# Patient Record
Sex: MALE | Race: WHITE | Employment: FULL TIME | URBAN - METROPOLITAN AREA
[De-identification: names, ages, dates, MRNs, and addresses within clinical notes are randomized per-mention and may not be internally consistent; named-entity substitution may affect disease eponyms.]

---

## 2021-06-17 ENCOUNTER — HOSPITAL ENCOUNTER (OUTPATIENT)
Age: 61
Setting detail: OBSERVATION
Discharge: HOME | End: 2021-06-17
Attending: STUDENT IN AN ORGANIZED HEALTH CARE EDUCATION/TRAINING PROGRAM | Admitting: INTERNAL MEDICINE
Payer: COMMERCIAL

## 2021-06-17 ENCOUNTER — APPOINTMENT (OUTPATIENT)
Dept: NON INVASIVE DIAGNOSTICS | Age: 61
End: 2021-06-17
Attending: INTERNAL MEDICINE
Payer: COMMERCIAL

## 2021-06-17 VITALS
DIASTOLIC BLOOD PRESSURE: 79 MMHG | HEART RATE: 77 BPM | BODY MASS INDEX: 26.77 KG/M2 | HEIGHT: 73 IN | RESPIRATION RATE: 14 BRPM | SYSTOLIC BLOOD PRESSURE: 135 MMHG | OXYGEN SATURATION: 97 % | WEIGHT: 202 LBS | TEMPERATURE: 98.7 F

## 2021-06-17 DIAGNOSIS — I48.92 ATRIAL FLUTTER WITH RAPID VENTRICULAR RESPONSE (HCC): Primary | ICD-10-CM

## 2021-06-17 PROBLEM — I10 HTN (HYPERTENSION), BENIGN: Status: ACTIVE | Noted: 2021-06-17

## 2021-06-17 LAB
ALBUMIN SERPL-MCNC: 4.3 G/DL (ref 3.5–5)
ALBUMIN/GLOB SERPL: 1.3 {RATIO} (ref 1.1–2.2)
ALP SERPL-CCNC: 95 U/L (ref 45–117)
ALT SERPL-CCNC: 49 U/L (ref 12–78)
ANION GAP SERPL CALC-SCNC: 11 MMOL/L (ref 5–15)
AST SERPL-CCNC: 28 U/L (ref 15–37)
ATRIAL RATE: 144 BPM
BASOPHILS # BLD: 0 K/UL (ref 0–0.1)
BASOPHILS NFR BLD: 0 % (ref 0–1)
BILIRUB SERPL-MCNC: 0.7 MG/DL (ref 0.2–1)
BUN SERPL-MCNC: 17 MG/DL (ref 6–20)
BUN/CREAT SERPL: 14 (ref 12–20)
CALCIUM SERPL-MCNC: 9.1 MG/DL (ref 8.5–10.1)
CALCULATED R AXIS, ECG10: -59 DEGREES
CALCULATED T AXIS, ECG11: -59 DEGREES
CHLORIDE SERPL-SCNC: 103 MMOL/L (ref 97–108)
CO2 SERPL-SCNC: 26 MMOL/L (ref 21–32)
COMMENT, HOLDF: NORMAL
CREAT SERPL-MCNC: 1.18 MG/DL (ref 0.7–1.3)
DIAGNOSIS, 93000: NORMAL
DIFFERENTIAL METHOD BLD: NORMAL
ECHO AO ROOT DIAM: 4.26 CM
ECHO AR MAX VEL PISA: 288.92 CENTIMETER/SECOND
ECHO AV AREA PEAK VELOCITY: 3.38 CM2
ECHO AV AREA/BSA PEAK VELOCITY: 1.6 CM2/M2
ECHO AV PEAK GRADIENT: 8.94 MMHG
ECHO AV PEAK VELOCITY: 149.5 CM/S
ECHO AV REGURGITANT PHT: 421.71 MS
ECHO EST RA PRESSURE: 3 MMHG
ECHO LA AREA 4C: 12.38 CM2
ECHO LA MAJOR AXIS: 3 CM
ECHO LA MINOR AXIS: 1.39 CM
ECHO LA VOL 2C: 37.03 ML (ref 18–58)
ECHO LA VOL 4C: 27.11 ML (ref 18–58)
ECHO LA VOL BP: 35.57 ML (ref 18–58)
ECHO LA VOL/BSA BIPLANE: 16.47 ML/M2 (ref 16–28)
ECHO LA VOLUME INDEX A2C: 17.14 ML/M2 (ref 16–28)
ECHO LA VOLUME INDEX A4C: 12.55 ML/M2 (ref 16–28)
ECHO LV E' LATERAL VELOCITY: 11.16 CENTIMETER/SECOND
ECHO LV E' SEPTAL VELOCITY: 8.74 CENTIMETER/SECOND
ECHO LV INTERNAL DIMENSION DIASTOLIC: 4.54 CM (ref 4.2–5.9)
ECHO LV INTERNAL DIMENSION SYSTOLIC: 2.91 CM
ECHO LV IVSD: 1.1 CM (ref 0.6–1)
ECHO LV MASS 2D: 149.1 G (ref 88–224)
ECHO LV MASS INDEX 2D: 69 G/M2 (ref 49–115)
ECHO LV POSTERIOR WALL DIASTOLIC: 0.84 CM (ref 0.6–1)
ECHO LVOT DIAM: 1.91 CM
ECHO LVOT PEAK GRADIENT: 12.55 MMHG
ECHO LVOT PEAK VELOCITY: 177.11 CM/S
ECHO MV E DECELERATION TIME (DT): 152.45 MS
ECHO MV E VELOCITY: 134.01 CENTIMETER/SECOND
ECHO PV MAX VELOCITY: 120.96 CM/S
ECHO PV PEAK INSTANTANEOUS GRADIENT SYSTOLIC: 5.93 MMHG
ECHO RIGHT VENTRICULAR SYSTOLIC PRESSURE (RVSP): 25.42 MMHG
ECHO RV INTERNAL DIMENSION: 3.84 CM
ECHO RV TAPSE: 1.82 CM (ref 1.5–2)
ECHO TV REGURGITANT MAX VELOCITY: 236.76 CM/S
ECHO TV REGURGITANT PEAK GRADIENT: 22.42 MMHG
EOSINOPHIL # BLD: 0.1 K/UL (ref 0–0.4)
EOSINOPHIL NFR BLD: 2 % (ref 0–7)
ERYTHROCYTE [DISTWIDTH] IN BLOOD BY AUTOMATED COUNT: 12.3 % (ref 11.5–14.5)
GLOBULIN SER CALC-MCNC: 3.4 G/DL (ref 2–4)
GLUCOSE SERPL-MCNC: 120 MG/DL (ref 65–100)
HCT VFR BLD AUTO: 46.2 % (ref 36.6–50.3)
HGB BLD-MCNC: 16.1 G/DL (ref 12.1–17)
IMM GRANULOCYTES # BLD AUTO: 0 K/UL (ref 0–0.04)
IMM GRANULOCYTES NFR BLD AUTO: 0 % (ref 0–0.5)
LA VOL DISK BP: 32.45 ML (ref 18–58)
LYMPHOCYTES # BLD: 1.2 K/UL (ref 0.8–3.5)
LYMPHOCYTES NFR BLD: 26 % (ref 12–49)
MAGNESIUM SERPL-MCNC: 2.1 MG/DL (ref 1.6–2.4)
MCH RBC QN AUTO: 32.9 PG (ref 26–34)
MCHC RBC AUTO-ENTMCNC: 34.8 G/DL (ref 30–36.5)
MCV RBC AUTO: 94.3 FL (ref 80–99)
MONOCYTES # BLD: 0.4 K/UL (ref 0–1)
MONOCYTES NFR BLD: 10 % (ref 5–13)
NEUTS SEG # BLD: 2.7 K/UL (ref 1.8–8)
NEUTS SEG NFR BLD: 62 % (ref 32–75)
NRBC # BLD: 0 K/UL (ref 0–0.01)
NRBC BLD-RTO: 0 PER 100 WBC
P-R INTERVAL, ECG05: 152 MS
PLATELET # BLD AUTO: 165 K/UL (ref 150–400)
PMV BLD AUTO: 11 FL (ref 8.9–12.9)
POTASSIUM SERPL-SCNC: 4.3 MMOL/L (ref 3.5–5.1)
PROT SERPL-MCNC: 7.7 G/DL (ref 6.4–8.2)
Q-T INTERVAL, ECG07: 370 MS
QRS DURATION, ECG06: 134 MS
QTC CALCULATION (BEZET), ECG08: 572 MS
RBC # BLD AUTO: 4.9 M/UL (ref 4.1–5.7)
SAMPLES BEING HELD,HOLD: NORMAL
SODIUM SERPL-SCNC: 140 MMOL/L (ref 136–145)
TROPONIN I SERPL-MCNC: <0.05 NG/ML
TROPONIN I SERPL-MCNC: <0.05 NG/ML
TSH SERPL DL<=0.05 MIU/L-ACNC: 1.79 UIU/ML (ref 0.36–3.74)
VENTRICULAR RATE, ECG03: 144 BPM
WBC # BLD AUTO: 4.4 K/UL (ref 4.1–11.1)

## 2021-06-17 PROCEDURE — 93306 TTE W/DOPPLER COMPLETE: CPT

## 2021-06-17 PROCEDURE — 74011250637 HC RX REV CODE- 250/637: Performed by: NURSE PRACTITIONER

## 2021-06-17 PROCEDURE — 85025 COMPLETE CBC W/AUTO DIFF WBC: CPT

## 2021-06-17 PROCEDURE — 36415 COLL VENOUS BLD VENIPUNCTURE: CPT

## 2021-06-17 PROCEDURE — 74011000250 HC RX REV CODE- 250: Performed by: STUDENT IN AN ORGANIZED HEALTH CARE EDUCATION/TRAINING PROGRAM

## 2021-06-17 PROCEDURE — 83735 ASSAY OF MAGNESIUM: CPT

## 2021-06-17 PROCEDURE — 99204 OFFICE O/P NEW MOD 45 MIN: CPT | Performed by: INTERNAL MEDICINE

## 2021-06-17 PROCEDURE — 99284 EMERGENCY DEPT VISIT MOD MDM: CPT

## 2021-06-17 PROCEDURE — 93306 TTE W/DOPPLER COMPLETE: CPT | Performed by: INTERNAL MEDICINE

## 2021-06-17 PROCEDURE — 93005 ELECTROCARDIOGRAM TRACING: CPT

## 2021-06-17 PROCEDURE — 99218 HC RM OBSERVATION: CPT

## 2021-06-17 PROCEDURE — 96366 THER/PROPH/DIAG IV INF ADDON: CPT

## 2021-06-17 PROCEDURE — 74011250637 HC RX REV CODE- 250/637: Performed by: INTERNAL MEDICINE

## 2021-06-17 PROCEDURE — 84443 ASSAY THYROID STIM HORMONE: CPT

## 2021-06-17 PROCEDURE — 74011250636 HC RX REV CODE- 250/636: Performed by: STUDENT IN AN ORGANIZED HEALTH CARE EDUCATION/TRAINING PROGRAM

## 2021-06-17 PROCEDURE — 96365 THER/PROPH/DIAG IV INF INIT: CPT

## 2021-06-17 PROCEDURE — 74011000258 HC RX REV CODE- 258: Performed by: STUDENT IN AN ORGANIZED HEALTH CARE EDUCATION/TRAINING PROGRAM

## 2021-06-17 PROCEDURE — 84484 ASSAY OF TROPONIN QUANT: CPT

## 2021-06-17 PROCEDURE — 74011250636 HC RX REV CODE- 250/636: Performed by: INTERNAL MEDICINE

## 2021-06-17 PROCEDURE — 96372 THER/PROPH/DIAG INJ SC/IM: CPT

## 2021-06-17 PROCEDURE — 96374 THER/PROPH/DIAG INJ IV PUSH: CPT

## 2021-06-17 PROCEDURE — 65660000000 HC RM CCU STEPDOWN

## 2021-06-17 PROCEDURE — 80053 COMPREHEN METABOLIC PANEL: CPT

## 2021-06-17 RX ORDER — ACETAMINOPHEN 325 MG/1
650 TABLET ORAL
Status: DISCONTINUED | OUTPATIENT
Start: 2021-06-17 | End: 2021-06-17 | Stop reason: HOSPADM

## 2021-06-17 RX ORDER — ENOXAPARIN SODIUM 100 MG/ML
1 INJECTION SUBCUTANEOUS EVERY 12 HOURS
Status: DISCONTINUED | OUTPATIENT
Start: 2021-06-17 | End: 2021-06-17

## 2021-06-17 RX ORDER — ASPIRIN 81 MG/1
81 TABLET ORAL DAILY
Qty: 30 TABLET | Refills: 1 | Status: SHIPPED | OUTPATIENT
Start: 2021-06-17 | End: 2021-07-17

## 2021-06-17 RX ORDER — SENNOSIDES 8.6 MG/1
1 TABLET ORAL DAILY PRN
Status: DISCONTINUED | OUTPATIENT
Start: 2021-06-17 | End: 2021-06-17 | Stop reason: HOSPADM

## 2021-06-17 RX ORDER — SODIUM CHLORIDE 0.9 % (FLUSH) 0.9 %
5-40 SYRINGE (ML) INJECTION EVERY 8 HOURS
Status: DISCONTINUED | OUTPATIENT
Start: 2021-06-17 | End: 2021-06-17 | Stop reason: HOSPADM

## 2021-06-17 RX ORDER — PROMETHAZINE HYDROCHLORIDE 25 MG/1
12.5 TABLET ORAL
Status: DISCONTINUED | OUTPATIENT
Start: 2021-06-17 | End: 2021-06-17 | Stop reason: HOSPADM

## 2021-06-17 RX ORDER — ACETAMINOPHEN 650 MG/1
650 SUPPOSITORY RECTAL
Status: DISCONTINUED | OUTPATIENT
Start: 2021-06-17 | End: 2021-06-17 | Stop reason: HOSPADM

## 2021-06-17 RX ORDER — ONDANSETRON 2 MG/ML
4 INJECTION INTRAMUSCULAR; INTRAVENOUS
Status: DISCONTINUED | OUTPATIENT
Start: 2021-06-17 | End: 2021-06-17 | Stop reason: HOSPADM

## 2021-06-17 RX ORDER — DILTIAZEM HYDROCHLORIDE 120 MG/1
120 CAPSULE, COATED, EXTENDED RELEASE ORAL DAILY
Qty: 30 CAPSULE | Refills: 1 | OUTPATIENT
Start: 2021-06-17 | End: 2021-07-29

## 2021-06-17 RX ORDER — POLYETHYLENE GLYCOL 3350 17 G/17G
17 POWDER, FOR SOLUTION ORAL DAILY PRN
Status: DISCONTINUED | OUTPATIENT
Start: 2021-06-17 | End: 2021-06-17 | Stop reason: HOSPADM

## 2021-06-17 RX ORDER — DILTIAZEM HYDROCHLORIDE 120 MG/1
120 CAPSULE, COATED, EXTENDED RELEASE ORAL DAILY
Status: DISCONTINUED | OUTPATIENT
Start: 2021-06-18 | End: 2021-06-17

## 2021-06-17 RX ORDER — DILTIAZEM HYDROCHLORIDE 120 MG/1
120 CAPSULE, COATED, EXTENDED RELEASE ORAL DAILY
Status: DISCONTINUED | OUTPATIENT
Start: 2021-06-17 | End: 2021-06-17 | Stop reason: HOSPADM

## 2021-06-17 RX ORDER — DILTIAZEM HYDROCHLORIDE 30 MG/1
30 TABLET, FILM COATED ORAL 4 TIMES DAILY
Status: DISCONTINUED | OUTPATIENT
Start: 2021-06-17 | End: 2021-06-17

## 2021-06-17 RX ORDER — DILTIAZEM HYDROCHLORIDE 5 MG/ML
0.25 INJECTION INTRAVENOUS ONCE
Status: COMPLETED | OUTPATIENT
Start: 2021-06-17 | End: 2021-06-17

## 2021-06-17 RX ORDER — SODIUM CHLORIDE 0.9 % (FLUSH) 0.9 %
5-40 SYRINGE (ML) INJECTION AS NEEDED
Status: DISCONTINUED | OUTPATIENT
Start: 2021-06-17 | End: 2021-06-17 | Stop reason: HOSPADM

## 2021-06-17 RX ADMIN — DILTIAZEM HYDROCHLORIDE 120 MG: 120 CAPSULE, COATED, EXTENDED RELEASE ORAL at 18:05

## 2021-06-17 RX ADMIN — SODIUM CHLORIDE 7.5 MG/HR: 900 INJECTION, SOLUTION INTRAVENOUS at 11:49

## 2021-06-17 RX ADMIN — SODIUM CHLORIDE 2.5 MG/HR: 900 INJECTION, SOLUTION INTRAVENOUS at 10:33

## 2021-06-17 RX ADMIN — DILTIAZEM HYDROCHLORIDE 23 MG: 5 INJECTION INTRAVENOUS at 10:26

## 2021-06-17 RX ADMIN — DILTIAZEM HYDROCHLORIDE 30 MG: 30 TABLET, FILM COATED ORAL at 14:42

## 2021-06-17 RX ADMIN — Medication 10 ML: at 13:08

## 2021-06-17 RX ADMIN — ENOXAPARIN SODIUM 90 MG: 100 INJECTION SUBCUTANEOUS at 12:26

## 2021-06-17 NOTE — PROGRESS NOTES
Brief Cardiology Note     Subjective:  Leandro Acharya is a 62 y.o. male who was admitted for atrial flutter w RVR. No significant PMHx. Objective   Visit Vitals  BP (!) 149/75   Pulse 95   Temp 99 °F (37.2 °C)   Resp 15   Ht 6' 1\" (1.854 m)   Wt 202 lb 2.6 oz (91.7 kg)   SpO2 97%   BMI 26.67 kg/m²       General: NAD, A&O x3  Resp: no accessory muscle use, CTAB  CV: irreg/accelerated RR, no mumur  GI: soft, non-tender  Le: no edema, +2 DP pulses        Impression Plan/Recommendation   1. Atrial flutter RVR                      · dilt gtt - goal V rate <110 bpm  · Start PO dilt   · Echo once rate slows down  · Full dose lovenox, [id number] =0 (=/- 1 htn- not dx). No indication for long term [facility] at this time unless Ef is depressed or we has to [facility] him.   · TSH WNL            Further recommendations to follow   Taqueria Braxton NP  [facility]

## 2021-06-17 NOTE — ED NOTES
TRANSFER - OUT REPORT:    Verbal report given to amr medic (name) on [facility].  being transferred to [facility]8 (unit) for routine progression of care       Report consisted of patients Situation, Background, Assessment and   Recommendations(SBAR). Information from the following report(s) SBAR was reviewed with the receiving nurse. Lines:   Peripheral IV 06/17/21 Right Antecubital (Active)   Site Assessment Clean, dry, & intact 06/17/21 0946   Phlebitis Assessment 0 06/17/21 0946   Infiltration Assessment 0 06/17/21 0946   Dressing Status Clean, dry, & intact 06/17/21 0946   Dressing Type Tape;Transparent 06/17/21 0946   Hub Color/Line Status Pink;Patent; Flushed 06/17/21 0946   Action Taken Blood drawn 06/17/21 0946        Opportunity for questions and clarification was provided.       Patient transported with:   Monitor

## 2021-06-17 NOTE — ED TRIAGE NOTES
Patient was at a meeting 1 hour ago, had eaten a full breakfast and noticed that his heart felt like it was going fast. No CP and No SOB.  Yesterday he turned in a 30 days holter monitor to his cardiologist

## 2021-06-17 NOTE — DISCHARGE INSTRUCTIONS
Patient Education        Atrial Flutter: Care Instructions  Your Care Instructions     Atrial flutter is a type of heartbeat problem (arrhythmia) that usually causes a fast heart rate. In atrial flutter, a problem with the heart's electrical system causes the two upper parts of the heart (the right atrium and the left atrium) to flutter, or beat very fast. Atrial flutter might be diagnosed using an an electrocardiogram (EKG). An EKG translates the heart's electrical activity into line tracings on paper. Treating atrial flutter is important for several reasons. The change in heartbeat can cause blood clots. The clots can travel from your heart to your brain and cause a stroke. A fast heartbeat can make you feel lightheaded, dizzy, and weak. And over time, it can also increase your risk for heart failure. Atrial flutter is often the result of another heart condition, such as coronary artery disease or some other heart rhythm problems. Making changes to improve your heart health will help you stay healthy and active. Your doctor may prescribe medicines to help slow down your heartbeat. You may also take medicine to help prevent a stroke. In some cases, a procedure called catheter ablation is done to stop atrial flutter. Follow-up care is a key part of your treatment and safety. Be sure to make and go to all appointments, and call your doctor if you are having problems. It's also a good idea to know your test results and keep a list of the medicines you take. How can you care for yourself at home? Medicines    · Take your medicines exactly as prescribed. Call your doctor if you think you are having a problem with your medicine. You will get more details on the specific medicines your doctor prescribes.     · If your doctor has given you a blood thinner to prevent a stroke, be sure you get instructions about how to take your medicine safely.  Blood thinners can cause serious bleeding problems.     · Do not take any Patient Education     Balanitis    Balanitis is an inflammation of the head of the penis. It can occur from a build up of germs (bacteria, virus, or fungus) under the foreskin, or exposure to soaps and other chemicals. In adults, this is most often a complication of diabetes. It can also occur due to obesity or poor genital cleaning habits. If not treated promptly, this can lead to a condition called phimosis. This is an inability to pull back the foreskin from the head of the penis. Symptoms of balanitis may include pain or tenderness of the penis, discharge, inability to retract the foreskin, difficulty urinating, and impotence. Home care  The following guidelines will help you care for your condition at home:  1. If you are able to retract your foreskin:  Â¨ Children. Retract the foreskin and clean with water. Apply antibiotic cream or ointment to the penis three times a day. Â¨ Adults. Retract the foreskin and clean with water. Unless another medicine was prescribed, apply clotrimazole cream (available without a prescription) to the penis three times a day. Avoid sexual activityÂ  while being treated. Â¨ Sitz baths. Soak penis and foreskin in warm water while inflammation is present. 2. If you have diabetes, talk to your doctor about achieving good control of your diabetes. 3. If you are overweight, talk to your doctor about a weight loss plan. Follow-up care  Follow up with your doctor or as advised by our staff. When to seek medical care  Get prompt medical attention if any of the following occur:  Â· Inability to retract the foreskin  Â· Inability to return the retracted foreskin to the forward position. This requires immediate attention! Â· Worsening of your symptoms  Â· Partial or complete blockage to the flow of urine  Â© 1869-8680 [employer] [address], [facility] [address] All rights reserved. This information is not intended as a substitute for professional medical care. Always follow your healthcare professional's instructions. vitamins, over-the-counter drugs, or herbal products without talking to your doctor first.   Lifestyle changes    · Do not smoke. Smoking can increase your chance of a stroke and heart attack. If you need help quitting, talk to your doctor about stop-smoking programs and medicines. These can increase your chances of quitting for good.     · Eat a heart-healthy diet.     · Stay at a healthy weight. Lose weight if you need to.     · Limit alcohol to 2 drinks a day for men and 1 drink a day for women. Too much alcohol can cause health problems.     · Avoid colds and flu. Get a pneumococcal vaccine shot. If you have had one before, ask your doctor whether you need another dose. Get a flu shot every year. If you must be around people with colds or flu, wash your hands often. Activity    · Talk to your doctor about what type and level of exercise is safe for you. Start light exercise if your doctor says it is okay. Walking is a good choice. Try for at least 30 minutes on most days of the week. You also may want to swim, bike, or do other activities.     · When you exercise, watch for signs that your heart is working too hard. You are pushing too hard if you can't talk while you exercise. If you become short of breath or dizzy or have chest pain, sit down and rest right away. When should you call for help? Call 911 anytime you think you may need emergency care. For example, call if:    · You have symptoms of a stroke. These may include:  ? Sudden numbness, tingling, weakness, or loss of movement in your face, arm, or leg, especially on only one side of your body. ? Sudden vision changes. ? Sudden trouble speaking. ? Sudden confusion or trouble understanding simple statements. ? Sudden problems with walking or balance. ? A sudden, severe headache that is different from past headaches.     · You passed out (lost consciousness).    Call your doctor now or seek immediate medical care if:    · You have new or increased shortness of breath.     · You feel dizzy or lightheaded, or you feel like you may faint.     · Your heart rate becomes irregular.     · You can feel your heart flutter in your chest or skip heartbeats. Tell your doctor if these symptoms are new or worse. Watch closely for changes in your health, and be sure to contact your doctor if you have any problems. Where can you learn more? Go to http://www.gray.com/  Enter X255 in the search box to learn more about \"Atrial Flutter: Care Instructions. \"  Current as of: 2020               Content Version: 12.8  © 9633-5695 [employer]. Care instructions adapted under license by [employer] (which disclaims liability or warranty for this information). If you have questions about a medical condition or this instruction, always ask your healthcare professional. [place] [address] any warranty or liability for your use of this information. HOSPITALIST DISCHARGE INSTRUCTIONS    NAME: Laine Su. :  1960   MRN:  [id number]     Date:     2021    ADMIT DATE: 2021     DISCHARGE DATE: 2021     PRINCIPAL ADMITTING DIAGNOSIS:  Atrial flutter with rapid ventricular response ([address] [I48.92]    DISCHARGE DIAGNOSES:  Principal Problem:    Atrial flutter with rapid ventricular response ([address] (2021)    MEDICATIONS:    · It is important that medications are taken exactly as they are prescribed on the discharge medication instructions and keep them your  in the bottles provided by the pharmacist.   · Keep a list of the medication names, dosages, and times to be taken at all times. · Do not take other medications without consulting your doctor.      Recommended diet:  Regular Diet    Recommended activity: Activity as tolerated    Post discharge care:    Notify follow up health care provider or return to the emergency department if you cannot get hold of your doctor if you feel worse or experience symptoms similar to those that brought you to hospital    Follow-up Information     Follow up With Specialties Details Why Shelva Cushing, MD Cardiology On 7/16/2021 340 to discuss a flutter ablation  [address] [facility]      Lisa Forrester MD Cardiology On 7/1/2021 1120 am [address] [phone]      Kristine North MD Family Medicine  for the regular follow up [id number] [address] [facility]  [phone]            Information obtained by :  I understand that if any problems occur once I am at home I am to contact my physician and I understand and acknowledge receipt of the instructions indicated above.                                                                                                                                            Physician's or R.N.'s Signature                                                                  Date/Time                                                                                                                                              Patient or Representative Signature                                                          Date/Time

## 2021-06-17 NOTE — ROUTINE PROCESS
Chart accessed to assist primary RN in discharging patient. Per Ivon Abraham NP discontinue 30 mg diltiazem give 120 mg diltiazem and patient to discharge to home. AVS printed.

## 2021-06-17 NOTE — PROGRESS NOTES
Problem: Falls - Risk of  Goal: *Absence of Falls  Description: Document Mirella Amador Fall Risk and appropriate interventions in the flowsheet.   Outcome: Progressing Towards Goal  Note: Fall Risk Interventions:            Medication Interventions: Assess postural VS orthostatic hypotension, Bed/chair exit alarm, Evaluate medications/consider consulting pharmacy, Patient to call before getting OOB, Teach patient to arise slowly

## 2021-06-17 NOTE — H&P
[facility]  [address] [facility] [address]  [phone]    Admission History and Physical      NAME:       Perla Hsu. :       1960   MRN:      [id number]     PCP:      Giles Currie MD     Date of service:   2021     Chief  Complaint:  Palpitations    History Of Presenting Illness:       Mr. Silver is a 62 y.o. male who is being admitted for Atrial flutter with rapid ventricular response ([address] Mr. Silver presented to our Emergency Department today complaining of palpitations. He says he had was at home after coming from a meeting when he noticed palpitations. No chest pain or SOB. He says this is his third episode in the last several weeks. He had been reviewed by his PCP and a 30 day event monitor done and he just mailed it back to his PCP about 5 days ago. He paris not know the results of the monitor. In the ED, he was noted to be in atrial flutter with RVR and was started on a diltiazem gtt. He will be admitted for further management. Allergies   Allergen Reactions    Minocycline Other (comments)     Made patient feel \"achy and lethargic\"       Prior to Admission medications    Medication Sig Start Date End Date Taking? Authorizing Provider   fluticasone (FLONASE) 50 mcg/actuation nasal spray 1 [place] by Both Nostrils route nightly. Yes Provider, Historical   hydrocodone-acetaminophen (NORCO) 5-325 mg per tablet Take 1-2 tablets by mouth every four (4) hours as needed for Pain. Patient not taking: Reported on 2021   Natalie Alas MD   ondansetron (ZOFRAN ODT) 4 mg disintegrating tablet Take 1 tablet by mouth every eight (8) hours as needed for Nausea.   Patient not taking: Reported on 2021   Natalie Alas MD       Past Medical History:   Diagnosis Date    Cancer [facility]) testicular & prostate        Past Surgical History:   Procedure Laterality Date    HX OTHER SURGICAL      testicular orchectomy    HX OTHER SURGICAL      prostatectomy    HX OTHER SURGICAL Left     removal of lipoma on arm       Social History     Tobacco Use    Smoking status: Never Smoker    Smokeless tobacco: Never Used   Substance Use Topics    Alcohol use: Yes     Alcohol/week: 5.8 standard drinks     Types: 7 Cans of beer per week        Family History   Problem Relation Age of Onset    Heart Disease Mother         Review of Systems:    Constitutional ROS: no fever, chills, rigors or night sweats  Respiratory ROS: no cough, sputum, hemoptysis, dyspnea or pleuritic pain. Cardiovascular ROS: no chest pain, orthopnea, PND or syncope but palpitations  Endocrine ROS: no polydispsia, polyuria, heat or cold intolerance or major weight change. Gastrointestinal ROS: no dysphagia, abdominal pain, nausea, vomiting or diarrhea    Genito-Urinary ROS: no dysuria, frequency, hematuria, retention or flank pain  Musculoskeletal ROS: no joint pain, swelling or muscular tenderness  Neurological ROS: no headache, confusion, focal weakness or any other neurological symptoms  Psychiatric ROS: no depression, anxiety, mood swings  Dermatological ROS: no rash, pruritis, or urticaria  Heme-Lymph ROS: no swollen glands, bleeding    Examination:    Constitutional:    Visit Vitals  BP (!) 149/75   Pulse 95   Temp 99 °F (37.2 °C)   Resp 15   Ht 6' 1\" (1.854 m)   Wt 91.7 kg (202 lb 2.6 oz)   SpO2 97%   BMI 26.67 kg/m²         General:  Weak and ill looking patient in no acute distress  Eyes: Pink conjunctivae, PERRLA with no discharge.  Normal eye movements  Ear, Nose, Mouth & Throat: No ottorrhea, rhinorrhea, non tender sinuses, dry mucous membranes  Respiratory:  No accessory muscle use, clear breath sounds without crackles or wheezes  Cardiovascular:  No JVD or murmurs, atrial fibrillation, without thrills, bruits or peripheral edema.  GI & :  Soft abdomen, non-tender, non-distended, normoactive bowel sounds with no palpable organomegaly  Heme:  No cervical or axillary adenopathy. Musculoskeletal:  No cyanosis, clubbing, atrophy or deformities  Skin:  No rashes, bruising or ulcers   Neurological: Awake and alert, speech is clear, CNs 2-12 are grossly intact and otherwise non focal  Psychiatric:  Has a good insight and is oriented x 3  ________________________________________________________________________    Data Review:    Labs:    Recent Labs     06/17/21  1001   WBC 4.4   HGB 16.1   HCT 46.2        Recent Labs     06/17/21  1001      K 4.3      CO2 26   *   BUN 17   CREA 1.18   CA 9.1   MG 2.1   ALB 4.3   ALT 49     No components found for: GLPOC  No results for input(s): PH, PCO2, PO2, HCO3, FIO2 in the last 72 hours. No results for input(s): INR, INREXT, INREXT in the last 72 hours. Imaging Studies:  none    Personally reviewed 12 lead EKG: atrial flutter with RVR      Assessment & Impression:     Mr. Silver is a 62 y.o. male being evaluated for:     Principal Problem:    Atrial flutter with rapid ventricular response ([address] (6/17/2021)         Plan of management:    Atrial flutter with rapid ventricular response ([address] (6/17/2021) POA: telemetry shows atrial fibrillation with RVR. Has apparently had intermittent episodes in past several weeks. Admit to hospital. TSH and initial troponin neg. Start IV Diltiazem gtt, therapeutic enoxaparin. Check serial troponin. Echocardiogram and consult cardiology    Hypertension POA: no prior hx. Continue Diltiazem.  Echo as noted above     Code Status:  Full    Surrogate decision maker: Family    Risk of deterioration: high      Total time spent for the care of the patient: [address] discussed with: Patient, Family, Nursing Staff and ED physician    Discussed:  Code Status, Care Plan and D/C Planning    Prophylaxis:  Lovenox    Probable Disposition:  Home w/Family           ___________________________________________________    Attending Physician: Walt Park MD

## 2021-06-17 NOTE — ED PROVIDER NOTES
The patient is a 60-year-old male presenting today secondary to an irregular heartbeat. He has a history of remote testicular and prostate cancer without other medical history. About an hour prior to arrival, shortly after he had eaten a large breakfast, he developed sudden onset of rapid heart rate. Denies any associated symptoms such as dizziness, sweating, chest pain or shortness of breath. He has had this intermittently and actually had just turned in a 30-day Holter monitor that was prescribed by his primary care doctor. He has not yet received the reading from this. He has no known history of A. fib or atrial flutter. He did not try anything for symptoms prior to arrival.           Past Medical History:   Diagnosis Date    Cancer [facility])     testicular & prostate       Past Surgical History:   Procedure Laterality Date    HX OTHER SURGICAL      testicular orchectomy    HX OTHER SURGICAL      prostatectomy    HX OTHER SURGICAL Left     removal of lipoma on arm         No family history on file. Social History     Socioeconomic History    Marital status:      Spouse name: Not on file    Number of children: Not on file    Years of education: Not on file    Highest education level: Not on file   Occupational History    Not on file   Tobacco Use    Smoking status: Never Smoker    Smokeless tobacco: Never Used   Substance and Sexual Activity    Alcohol use: Yes     Alcohol/week: 5.8 standard drinks     Types: 7 Cans of beer per week    Drug use: No    Sexual activity: Not on file   Other Topics Concern    Not on file   Social History Narrative    Not on file     Social Determinants of Health     Financial Resource Strain:     Difficulty of Paying Living Expenses:    Food Insecurity:     Worried About Running Out of Food in the Last Year:     [address] in the Last Year:    Transportation Needs:     Lack of Transportation (Medical):      Lack of Transportation (Non-Medical): Physical Activity:     Days of Exercise per Week:     Minutes of Exercise per Session:    Stress:     Feeling of Stress :    Social Connections:     Frequency of Communication with Friends and Family:     Frequency of Social Gatherings with Friends and Family:     Attends [employer] Services:     Active Member of Clubs or Organizations:     Attends Club or Organization Meetings:     Marital Status:    Intimate Partner Violence:     Fear of Current or Ex-Partner:     Emotionally Abused:     Physically Abused:     Sexually Abused: ALLERGIES: Minocycline    Review of Systems   Constitutional: Negative for chills and fever. HENT: Negative for congestion and sore throat. Eyes: Negative for pain and redness. Respiratory: Negative for cough and shortness of breath. Cardiovascular: Positive for palpitations. Negative for chest pain. Gastrointestinal: Negative for abdominal pain, diarrhea, nausea and vomiting. Genitourinary: Negative for frequency and hematuria. Musculoskeletal: Negative for back pain and neck pain. Skin: Negative for rash and wound. Neurological: Negative for dizziness and headaches. Hematological: Does not bruise/bleed easily. Vitals:    06/17/21 1026 06/17/21 1030 06/17/21 1045 06/17/21 1100   BP: (!) 171/118 (!) 153/83 (!) 163/75 (!) 141/84   Pulse: (!) 135 (!) 133 (!) 107 (!) 117   Resp:  15 16 20   Temp:       SpO2:  100% 99% 97%   Weight:       Height:                Physical Exam  Vitals and nursing note reviewed. Constitutional:       General: He is not in acute distress. Appearance: He is well-developed. HENT:      Head: Normocephalic and atraumatic. Eyes:      Conjunctiva/sclera: Conjunctivae normal.      Pupils: Pupils are equal, round, and reactive to light. Cardiovascular:      Rate and Rhythm: Regular rhythm. Tachycardia present. Heart sounds: Normal heart sounds. No murmur heard. No friction rub. No gallop.     Pulmonary: Effort: Pulmonary effort is normal. No respiratory distress. Breath sounds: Normal breath sounds. No wheezing or rales. Abdominal:      General: Bowel sounds are normal. There is no distension. Palpations: Abdomen is soft. Tenderness: There is no abdominal tenderness. There is no guarding or rebound. Musculoskeletal:         General: Normal range of motion. Cervical back: Normal range of motion and neck supple. Skin:     General: Skin is warm and dry. Capillary Refill: Capillary refill takes less than 2 seconds. Findings: No rash. Neurological:      Mental Status: He is alert and oriented to person, place, and time. EKG Interpretation:   ED Physician interpretation  Atrial flutter with a 2-1 block rate of 140, no ST elevations or depressions. Narrow QRS    Labs Reviewed:   Normal TSH  Normal electrolytes  Normal renal function  No leukocytosis or anemia      Imaging Reviewed:   N/A      Course:  I attempted vagal maneuvers with the patient. When this was performed his heart rate briefly slowed on the monitor and clear flutter waves were noted    Diltiazem bolus pushed, heart rate slowed to the 70s/80s with flutter waves present then heart rate rebounded to the 140s and 50s. Diltiazem infusion started    D/w Dr. Michael Chappell of cardiology. Agreed with plan, recommended admission. Recommended Lovenox/heparin rather than oral anticoagulation at this time. Perfect Serve Consult for Admission  11:23 AM    ED Room Number: WER04/04  Patient Name and age:  Yvonne Mohr. 62 y.o.  male  Working Diagnosis:   1.  Atrial flutter with rapid ventricular response ([address]        COVID-19 Suspicion:  no  Sepsis present:  no  Reassessment needed: no  Code Status:  Full Code  Readmission: no  Isolation Requirements:  no  Recommended Level of Care:  step down  Department:[facility] - [phone]  Other:  62 y.o. male new onset a flutter RVR rate in 150's, on dilt gtt after bolus, HR improving but still high. Starting on lovenox. D/w cardiology recommending admit. MDM: Patient is a 78-year-old male presenting today with atrial flutter with rapid ventricular response. Blood pressure stable. No shortness of breath or chest pain. Since this has been going on and off for a while now and he had just eaten prior to arrival without vital sign instability or chest pain/shortness of breath I did not feel it was appropriate to cardiovert him. He was started on diltiazem. He does not appear to be in heart failure. He will be admitted to the hospital for further management. Clinical Impression:     ICD-10-CM ICD-9-CM    1. Atrial flutter with rapid ventricular response (HCC)  I48.92 427.32            Disposition: Admit    Total critical care time spent exclusive of procedures:  55  Due to a high probability of clinically significant, life threatening deterioration, the patient required my highest level of preparedness to intervene emergently and I personally spent this critical care time directly and personally managing the patient. This critical care time included obtaining a history; examining the patient; pulse oximetry; ordering and review of studies; arranging urgent treatment with development of a management plan; evaluation of patient's response to treatment; frequent reassessment; and, discussions with other providers. This critical care time was performed to assess and manage the high probability of imminent, life-threatening deterioration that could result in multi-organ failure. It was exclusive of separately billable procedures and treating other patients and teaching time.

## 2021-06-17 NOTE — ED NOTES
TRANSFER - OUT REPORT:    Verbal report given to ainsley szymanski rn (name) on Elsa Kaye.  being transferred to [facility] (unit) for routine progression of care       Report consisted of patients Situation, Background, Assessment and   Recommendations(SBAR). Information from the following report(s) SBAR was reviewed with the receiving nurse. Lines:   Peripheral IV 06/17/21 Right Antecubital (Active)   Site Assessment Clean, dry, & intact 06/17/21 0946   Phlebitis Assessment 0 06/17/21 0946   Infiltration Assessment 0 06/17/21 0946   Dressing Status Clean, dry, & intact 06/17/21 0946   Dressing Type Tape;Transparent 06/17/21 0946   Hub Color/Line Status Pink;Patent; Flushed 06/17/21 0946   Action Taken Blood drawn 06/17/21 0946        Opportunity for questions and clarification was provided.       Patient transported with:   Monitor

## 2021-06-17 NOTE — ED NOTES
Transfer Assessment: Patient A&O x4 and in no distress. Physical re-examination reveals just a slowing of HR since arrival and is now at 10mg of cardizem per drip rate. Still no CP and no SOB.  Wife will meet patient at [facility]

## 2021-06-17 NOTE — CONSULTS
Arti Weaver MD., [facility]    [address] [place], [address] [facility]    Office [phone],ZEP [phone]           6/17/2021     Admit Date: 6/17/2021      Bree Manuel is a 62 y.o. male admitted for Atrial flutter with rapid ventricular response ([address] [I48.92]. Consult requested by Edu Degroot MD       Assessment/Plan:    SVT-a flutter with RVR  History of prostate/testicular cancer    Plan:  Echocardiogram  Keep n.p.o. We will plan on ELDA DCCV if EF normal on echo. Anticoagulation-full dose Lovenox     Add: Pt converted to SR on IV dilt gtt  Start PO Cardizem ( home on Cardizem CD 120mg daily)  Low [id number] score - will not start DOAC  Nml EF on echo  Can be DC'd from cardiac standpoint  F/u as OP      Please do not hesitate to contact us with questions or concerns. See note below for details. Arti Weaver MD      Cardiac Testing/Procedures: A. Cardiac Cath/PCI:    B.ECHO/ELDA:    C.StressNuclear/Stress ECHO/Stress test:    D.Vascular:    E. EP:    F. Miscellaneous:    History:     Patient  is a 62 y.o. male admitted for SVT. Presented to ED with irregular heartbeat. No dizziness, chest pain, dyspnea, diaphoresis, edema. This morning he suddenly noticed rapid irregular heartbeat and came to the ED. He has had similar symptoms recently and just finished wearing a monitor (PCP). No prior history of CAD/arrhythmia. In ED, Cardizem gtt. was started with slowing down of the heart rate and per ED physician-flutter was noted on telemetry.        Creatinine 1.18, troponin less than 0.05, TSH 1.79    PMH/PSH/FH/Soc Hx:     Past Medical History:   Diagnosis Date    Cancer [facility])     testicular & prostate      Past Surgical History:   Procedure Laterality Date    HX OTHER SURGICAL      testicular orchectomy    HX OTHER SURGICAL      prostatectomy    HX OTHER SURGICAL Left     removal of lipoma on arm     Allergies   Allergen Reactions    Minocycline Other (comments)     Made patient feel \"achy and lethargic\"     No family history on file. Social History     Tobacco Use    Smoking status: Never Smoker    Smokeless tobacco: Never Used   Substance Use Topics    Alcohol use: Yes     Alcohol/week: 5.8 standard drinks     Types: 7 Cans of beer per week    Drug use: No           Medications:       Current Facility-Administered Medications   Medication Dose Route Frequency    dilTIAZem (CARDIZEM) 100 mg in 0.9% sodium chloride (MBP/ADV) 100 mL infusion  0-15 mg/hr IntraVENous TITRATE    sodium chloride (NS) flush 5-40 mL  5-40 mL IntraVENous Q8H    sodium chloride (NS) flush 5-40 mL  5-40 mL IntraVENous PRN    acetaminophen (TYLENOL) tablet 650 mg  650 mg Oral Q6H PRN    Or    acetaminophen (TYLENOL) suppository 650 mg  650 mg Rectal Q6H PRN    polyethylene glycol (MIRALAX) packet 17 g  17 g Oral DAILY PRN    senna (SENOKOT) tablet 8.6 mg  1 Tablet Oral DAILY PRN    promethazine (PHENERGAN) tablet 12.5 mg  12.5 mg Oral Q6H PRN    Or    ondansetron (ZOFRAN) injection 4 mg  4 mg IntraVENous Q6H PRN    enoxaparin (LOVENOX) injection 90 mg  1 mg/kg SubCUTAneous Q12H       Review of Systems:     As in HPI - all other 10 point  ROS negative      Physical Exam:       Visit Vitals  /69   Pulse 81   Temp 99 °F (37.2 °C)   Resp 15   Ht 6' 1\" (1.854 m)   Wt 202 lb 2.6 oz (91.7 kg)   SpO2 98%   BMI 26.67 kg/m²         Telemetry:     Gen: Well-developed, well-nourished, in no acute distress  Neck: Supple,No JVD, No Carotid Bruit,   Resp: No accessory muscle use, Clear breath sounds, No rales or rhonchi  Card: Irregular Rate,Rythm,Normal S1, S2, No murmurs, rubs or gallop. No thrills.    Abd:  Soft, BS+,   MSK: No cyanosis  Skin: No rashes    Neuro: moving all four extremities , follows commands appropriately  Psych:  Good insight, oriented to person, place , alert, Nml Affect  LE: No edema    EKG: SVT - Prob aflutter with RVR        Cxray: Reviewed LABS: Reviewed      Care Plan discussed with: Patient and Nursing Staff      Total time:      mins     Karen Swan MD

## 2021-06-17 NOTE — DISCHARGE SUMMARY
Mike Fishman sathya [address]  [address]  Tel: [phone]    Physician Discharge Summary    Patient ID:    Starla Haywood. Age:              62 y.o.    : 1960  MRN:             [id number]     PCP: Anita Kapoor MD     Date of Admission: 2021    Date of Discharge:  2021    Principal admission Diagnosis:   Atrial flutter with rapid ventricular response [facility]) [I48.92]    Discharge Diagnoses:  Principal Problem:    Atrial flutter with rapid ventricular response ([address] (2021)    HTN (hypertension), benign (2021)    Hospital Course:     Mr. Silver is a 62 y.o. admitted to [facility] and treated for the following:    Atrial flutter with rapid ventricular response ([address] (2021) POA: telemetry shows atrial fibrillation with RVR. Has apparently had intermittent episodes in past several weeks. Had been started on IV diltiazem and spontaneously converted to NSR. Has remained stable without symptoms. Troponin was neg. TSH normal. Echocardiogram showed a normal LV function. Seen by cardiology who recommended discharge with outpatient follow up as well as EP evaluation. He will be discharged home on Cardizem and Asprin. Hypertension POA: no prior hx. Continue Diltiazem       Discharge Exam:    Visit Vitals  /74 (BP 1 Location: Left upper arm, BP Patient Position: At rest)   Pulse 73   Temp 98.7 °F (37.1 °C)   Resp 13   Ht 6' 1\" (1.854 m)   Wt 91.6 kg (202 lb)   SpO2 98%   BMI 26.65 kg/m²      Patient has been seen and examined. See my H&P    Activity: Activity as tolerated    Diet: Regular Diet    Current Discharge Medication List        START taking these medications    Details   dilTIAZem ER (CARDIZEM CD) 120 mg capsule Take 1 Capsule by mouth daily.  Indications: ventricular rate control in atrial fibrillation  Qty: 30 Capsule, Refills: 1      aspirin delayed-release 81 mg tablet Take 1 Tablet by mouth daily for 30 days. Qty: 30 Tablet, Refills: 1           CONTINUE these medications which have NOT CHANGED    Details   fluticasone (FLONASE) 50 mcg/actuation nasal spray 1 [place] by Both Nostrils route nightly. hydrocodone-acetaminophen (NORCO) 5-325 mg per tablet Take 1-2 tablets by mouth every four (4) hours as needed for Pain. Qty: 50 tablet, Refills: 0      ondansetron (ZOFRAN ODT) 4 mg disintegrating tablet Take 1 tablet by mouth every eight (8) hours as needed for Nausea. Qty: 20 tablet, Refills: 0             Follow-up Information       Follow up With Specialties Details Why Contact Mohinder Zhang MD Cardiology On 7/16/2021 340 to discuss a flutter ablation  [address] [place] [address] 197      Teresita Perez MD Cardiology On 7/1/2021 1120 am [address] [facility],[address]      Rosa Isela Yates MD Family Medicine  for the regular follow up [address] [facility]  [phone]              Follow-up tests or labs: As noted above if any. Discharge Condition: Stable    Disposition: home    Time taken to arrange discharge:  35 minutes.     Signed:  Adrianna Soto MD     [facility]  6/17/2021   4:18 PM

## 2021-06-17 NOTE — PROGRESS NOTES
TRANSFER - IN REPORT:    Verbal report received from Evaristo Watkins RN(name) on [place].  being received from [facility] ED(unit) for routine progression of care      Report consisted of patients Situation, Background, Assessment and   Recommendations(SBAR). Information from the following report(s) SBAR, Kardex, ED Summary, Procedure Summary, Intake/Output, MAR, Recent Results, Cardiac Rhythm AFLUTTER and Alarm Parameters  was reviewed with the receiving nurse. Opportunity for questions and clarification was provided. Assessment completed upon patients arrival to unit and care assumed. 1300- Received patient from [facility] ED, [facility] arrived with patient. AAOx4, able to make needs known. Denies chest pain at this time. No other pain noted. Respirations even easy unlabored. No SOB or cough noted. Lung sounds clear B/L. No edema noted. No skin breakdown. Cardizem running @ 10 for rapid HR. Call bell placed in reach. Bed locked in lowest position. 1310- Increased Cardizem to 12.5.  1350- Dr. Bibiana Castano at bedside. HR at times 80s-102, and then pop up to 120s, still bursts of Aflutter at times. Pt trying to convert with Cardizem @ 12.5  1400- No changes currently. 1415- Echo being completed at this time. 1429- Troponin sent per order. [address] NP from cardiology at bedside. Noted patient has converted to NSR, provided with PO Cardizem per order and to cut off Cardizem gtt as tolerated. Urinating well with urinal, no difficulty noted. Tolerated PO medication well, no difficulty swallowing. 1450- Cardizem reduced to 10. Per Cardiology, patient can eat, and if tolerating off cardizen, can downgrade to telemetry. 1500- Per Cindy Olmstead NP- can reduce gtt to 5.  1520- Cardizem cut to 5.  1550- Cardizem gtt turned off. At this time HR remains 70s, BP stable. No changes to patient's assessment. 1600- Patient has remained in Sinus Rhythm. No change in assessment.  Dr. Rasheeda Camarena in to see patient, plan to discharge later today. 1624- Per Dr. David Sosa, plan to discharge later today. Provide 120 mg dose of Cardizem @ 1800.  1700- No changes at this time. Still remains in NSR, BP stable. Plan continues to be discharge after providing with  mg Cardizem. 65- Reviewed discharge paperwork with patient. IV Removed. Tolerated PO medication. 1835- Pt denied need for wheelchair, walked out with steady gait and denied dizziness. All belongings with patient and wife.

## 2021-06-18 ENCOUNTER — PATIENT OUTREACH (OUTPATIENT)
Dept: CASE MANAGEMENT | Age: 61
End: 2021-06-18

## 2021-06-18 NOTE — PROGRESS NOTES
Care Transitions Initial Call    Call within 2 business days of discharge: Yes     Patient: Qamar Portillo. Patient : 1960 MRN: [id number]    Last Discharge [address]       Complaint Diagnosis Description Type Department Provider    21 Rapid Heart Rate Atrial flutter with rapid ventricular response [facility]) ED to Hosp-Admission (Discharged) (ADMIT) [id number] Lexie Rajput MD; Isaac Frances .. Was this an external facility discharge? No     Challenges to be reviewed by the provider   Additional needs identified to be addressed with provider:  Yes    AFlutter-cardiology consulted- Trop neg; TSH -nl. echo and EKG- converted with IV diltiazem- discharged on oral diltiazem 120 CD daily. CHADS score low- no OAC initiated- ASA 81 mg daily. Follow up with Cardiology on:   with Jamie,  with Dr. Shay Devi EP to discuss options- Ablation. Method of communication with provider : face to face, staff message, phone, none    COVID-19 related testing was not done at this time. Advance Care Planning:   Does patient have an Advance Directive: not on file. Inpatient Readmission Risk score: Unplanned Readmit Risk Score: 6    Was this a readmission? no     Patients top risk factors for readmission: lack of knowledge about disease and medical condition-new AFlutter, possible HTN    Interventions to address risk factors: Education of patient/family/caregiver/guardian to support self-management-AFlutter and HTN and Assessment and support for treatment adherence and medication management-hydration, triggers for AFlutter-irregular HR, low NA eating    Care Transition Nurse (CTN) contacted the patient by telephone to perform post hospital discharge assessment. Verified name and  with patient as identifiers. Provided introduction to self, and explanation of the CTN role.      CTN reviewed discharge instructions, medical action plan and red flags with patient who verbalized understanding. Were discharge instructions available to patient? yes. Reviewed appropriate site of care based on symptoms and resources available to patient including: Specialist, When to call 911 and [address]. Patient given an opportunity to ask questions and does not have any further questions or concerns at this time. The patient agrees to contact the PCP office for questions related to their healthcare. Medication reconciliation was performed with patient, who verbalizes understanding of administration of home medications. Advised obtaining a 90-day supply of all daily and as-needed medications. Referral to Pharm D needed: no     Home Health/Outpatient orders at discharge: none    Durable Medical Equipment ordered at discharge: None    Covid Risk Education    Educated patient about risk for severe COVID-19 due to risk factors according to CDC guidelines. CTN reviewed discharge instructions, medical action plan and red flag symptoms with the patient who verbalized understanding. Discussed COVID vaccination status: no. Education provided on COVID-19 vaccination as appropriate. Discussed exposure protocols and quarantine with CDC Guidelines. Patient was given an opportunity to verbalize any questions and concerns and agrees to contact CTN or health care provider for questions related to their healthcare. Was patient discharged with a pulse oximeter? NA    Discussed follow-up appointments. If no appointment was previously scheduled, appointment scheduling offered: not needed. Is follow up appointment scheduled within 7 days of discharge? no.   [facility] follow up appointment(s):   Future Appointments   Date Time Provider Matt Butler   7/1/2021 11:20 AM MD JOSÉ Luu BS AMB   7/16/2021  3:40 PM MD JOSÉ John BS AMB     Non-[facility] follow up appointment(s):   PCP- Dr. Faizan Stein for follow-up call in 5-7 days based on severity of symptoms and risk factors.   Plan for next call: symptom management-assess for further episodes, new symptoms possible related to SE from new medication and self management-monitoring BP and HR, daily weights, signs of fluid retention  CTN provided contact information for future needs. Goals Addressed                 This Visit's Progress       General     Reduce risk for hospitalization        6/18/21- return call from Mr. Silver. He feels well. Started new diltiazem med at home today- he had read about medication- CTN further explained and answered questions. He will obtain new BP cuff- will look for one that has features to help with identifying irregular HR. Explained good routine for checking BP- asked him to do BID- in am prior to medication and later in day. Record. Document readings around symptoms-episodes. Keep a diary of the information- activities, etc.   Reviewed parameters:  -140/55-80. HR 60-90 at rest, <100 with activity. If HR in 50's and feels well- can monitor. If HR in 40- notify cardiology. Monitor for s/sx light headed-dizzy- reach out to cardiology for guidance for symptoms and/or values consistently out of range. He has also looked at other technology that can be helpful with living with irregular HR. Asked him to look at website Up to Date- read about AFib-flutter and activate [employer] Account- reach out to Dr. Pepper Habermann and ask questions about what he feels would be worth investing in for technology. He relays wearing heart monitor for 30 days- reports that he did not have episodes during that period. PCP ordered and he mailed back this past Monday. The evening prior to this last episode- he had done exercising out by pool. He was given list of triggers-items that may cause irregular HR. He was an unsweet tea drinker-throughout most of the day. Did consume some alcohol- he has stopped both. Discussion about hydrating fluids- 64 ounces each day.  More if he is outdoors- in heat and especially if perspiring. Brief discussion about options for AFlutter treatment. He has appointments in place to meet with EP- Dr. Chay Saunders and follow up with Dr. Rochelle Desir.

## 2021-07-01 ENCOUNTER — OFFICE VISIT (OUTPATIENT)
Dept: CARDIOLOGY CLINIC | Age: 61
End: 2021-07-01
Payer: COMMERCIAL

## 2021-07-01 VITALS
SYSTOLIC BLOOD PRESSURE: 148 MMHG | OXYGEN SATURATION: 98 % | BODY MASS INDEX: 27.43 KG/M2 | HEIGHT: 73 IN | HEART RATE: 78 BPM | WEIGHT: 207 LBS | DIASTOLIC BLOOD PRESSURE: 88 MMHG

## 2021-07-01 DIAGNOSIS — I10 HTN (HYPERTENSION), BENIGN: Primary | ICD-10-CM

## 2021-07-01 PROCEDURE — 99214 OFFICE O/P EST MOD 30 MIN: CPT | Performed by: INTERNAL MEDICINE

## 2021-07-01 NOTE — LETTER
7/7/2021    Patient: Farrukh Garcia. YOB: 1960   Date of Visit: 7/1/2021     Jessica Staton, [address] [facility] [address] [phone]  Via Fax: [phone]    Dear Jessica Staton MD,      Thank you for referring Mr. Chris Sanchez to [facility] for evaluation. My notes for this consultation are attached. If you have questions, please do not hesitate to call me. I look forward to following your patient along with you.       Sincerely,    Roosevelt Olmstead MD

## 2021-07-01 NOTE — PROGRESS NOTES
Krystal Bynum. is a 62 y.o. male    Chief Complaint   Patient presents with   [facility] Follow Up     aflutter        Chest pain No    SOB No    Dizziness patient states some dizziness-slight at times thinks it is coming from medication     Swelling No    Refills No    Visit Vitals  BP (!) 148/88 (BP 1 Location: Left upper arm, BP Patient Position: Sitting)   Pulse 78   Ht 6' 1\" (1.854 m)   Wt 207 lb (93.9 kg)   SpO2 98%   BMI 27.31 kg/m²       1. Have you been to the ER, urgent care clinic since your last visit? Hospitalized since your last visit? ED 6/17    2. Have you seen or consulted any other health care providers outside of the [address] [place] [address] since your last visit? Include any pap smears or colon screening.   No

## 2021-07-02 NOTE — PROGRESS NOTES
Carmen Proctor MD., [facility]    [address] [place], [address] [facility]    Office [phone],HNL [phone]           Sherry Hendricks is here for a f/u office visit. Primary care physician:  Cynthia Tubbs MD    CC - as documented in EMR    Dear Dr. Cynthia Tubbs MD    I had the pleasure of seeing Ms./Mr. Sherry Hendricks in the office today. Assessment:     SVT-a flutter with RVR-[facility] admission 6/17/2021. Converted to sinus rhythm on Cardizem gtt. Not on anticoagulation-low [id number] risk  History of prostate/testicular cancer      Plan: Tolerating Cardizem. Home blood pressures well controlled. No recurrence of SVT after discharge from recent hospitalization. Will refer to EP-Dr. Nate Lucio for evaluation. Patient has cut back on his EtOH intake. Previously used to drink 2 to 3 glasses of wine/beer daily. Aggressive cardiovascular risk factor modification  Follow-up 6 months/earlier as needed    Patient understands the plan. All questions were answered to the patient's satisfaction. I appreciate the opportunity to be involved in Mr. White. See note below for details. Please do not hesitate to contact us with questions or concerns. Carmen Proctor MD      Cardiac Testing/ Procedures: A. Cardiac Cath/PCI:    B.ECHO/ELDA: 6/70/21-LV: Estimated LVEF is 60 - 65%. Normal cavity size, wall thickness, systolic function (ejection fraction normal) and diastolic function. Wall motion: normal.  AO: Mild sinuses of Valsalva dilatation. C.StressNuclear/Stress ECHO/Stress test:    D.Vascular:    E. EP:    F. Miscellaneous:    History:     Sherry Hendricks is a 62 y.o. male who returns for follow up visit. No CP/dyspnea/swelling LE/palpitaitons. He is wearing an apple watch and has heart rate has been in the 60s. His home systolic blood pressures are in the 1 10-1 20s.     ROS:  (bold if positive, if negative) Medications:       Current Outpatient Medications   Medication Sig Dispense    ZINC PO Take  by mouth.  Multivitamins with Fluoride (MULTI-VITAMIN PO) Take  by mouth.  dilTIAZem ER (CARDIZEM CD) 120 mg capsule Take 1 Capsule by mouth daily. Indications: ventricular rate control in atrial fibrillation 30 Capsule    aspirin delayed-release 81 mg tablet Take 1 Tablet by mouth daily for 30 days. 30 Tablet    fluticasone (FLONASE) 50 mcg/actuation nasal spray 1 [place] by Both Nostrils route daily.  hydrocodone-acetaminophen (NORCO) 5-325 mg per tablet Take 1-2 tablets by mouth every four (4) hours as needed for Pain. (Patient not taking: Reported on 6/17/2021) 50 tablet    ondansetron (ZOFRAN ODT) 4 mg disintegrating tablet Take 1 tablet by mouth every eight (8) hours as needed for Nausea. (Patient not taking: Reported on 6/17/2021) 20 tablet     No current facility-administered medications for this visit. Family History of CAD:    No    Social History:  Current  Smoker  No    Physical Exam:     Visit Vitals  BP (!) 148/88 (BP 1 Location: Left upper arm, BP Patient Position: Sitting)   Pulse 78   Ht 6' 1\" (1.854 m)   Wt 207 lb (93.9 kg)   SpO2 98%   BMI 27.31 kg/m²          Gen: Well-developed, well-nourished, in no acute distress  Neck: Supple,No JVD, No Carotid Bruit,   Resp: No accessory muscle use, Clear breath sounds, No rales or rhonchi  Card: Regular Rate,Rythm,Normal S1, S2, No murmurs, rubs or gallop. No thrills.    Abd:  Soft, non-tender, non-distended,BS+,   MSK: No cyanosis  Skin: No rashes    Neuro: moving all four extremities , follows commands appropriately  Psych:  Good insight, oriented to person, place , alert, Nml Affect  LE: No edema    EKG:       Medication Side Effects and Warnings were discussed with patient: yes  Patient Labs were reviewed and/or requested:  yes  Patient Past Records were reviewed and/or requested: yes    Total time :        mins    ATTENTION:   This medical record was transcribed using an electronic medical records/speech recognition system. Although proofread, it may and can contain electronic, spelling and other errors. Corrections may be executed at a later time. Please feel free to contact us for any clarifications as needed.       Allie Ware MD

## 2021-07-15 ENCOUNTER — PATIENT OUTREACH (OUTPATIENT)
Dept: CASE MANAGEMENT | Age: 61
End: 2021-07-15

## 2021-07-15 NOTE — PROGRESS NOTES
Patient has graduated from the Transitions of Care Coordination  program on 7/17/21. Patient/family has the ability to self-manage at this time Care management goals have been completed. Patient was not referred to the [place] team for further management. Goals Addressed                 This Visit's Progress       General     COMPLETED: Reduce risk for hospitalization        7/15/21- spoke with Mr. Silver. He has been doing well- has not had further episodes of fast HR- he has an Apple watch- noticed that with increased activity his HR can increase to 110's with no symptoms. His home SBP's have been 110's- some 120's. Resting HR in 60's. He had resumed activities- except not riding bicycle on the road. He has noted some dizziness with \"looking up, some sudden movements:. Reports one episode of \"sensation at bottom of throat either with dinner or just after dinner- he belched and felt it must have been gas\". Explained stomach and heart close in proximity. He has attended follow up with Primary Cardiology and has EP tomorrow to discuss ablation. Answered questions briefly and reminded him to use MyChart if he has further questions. He has friends who have ablations and have had good results. [facility]     6/18/21- return call from Mr. Silver. He feels well. Started new diltiazem med at home today- he had read about medication- CTN further explained and answered questions. He will obtain new BP cuff- will look for one that has features to help with identifying irregular HR. Explained good routine for checking BP- asked him to do BID- in am prior to medication and later in day. Record. Document readings around symptoms-episodes. Keep a diary of the information- activities, etc.   Reviewed parameters:  -140/55-80. HR 60-90 at rest, <100 with activity. If HR in 50's and feels well- can monitor. If HR in 40- notify cardiology.    Monitor for s/sx light headed-dizzy- reach out to cardiology for guidance for symptoms and/or values consistently out of range. He has also looked at other technology that can be helpful with living with irregular HR. Asked him to look at website Up to Date- read about AFib-flutter and activate [employer] Account- reach out to Dr. Nasra Desir and ask questions about what he feels would be worth investing in for technology. He relays wearing heart monitor for 30 days- reports that he did not have episodes during that period. PCP ordered and he mailed back this past Monday. The evening prior to this last episode- he had done exercising out by pool. He was given list of triggers-items that may cause irregular HR. He was an unsweet tea drinker-throughout most of the day. Did consume some alcohol- he has stopped both. Discussion about hydrating fluids- 64 ounces each day. More if he is outdoors- in heat and especially if perspiring. Brief discussion about options for AFlutter treatment. He has appointments in place to meet with EP- Dr. Nasra Desir and follow up with Dr. Nora Phalen. Patient has Care Transition Nurse's contact information for any further questions, concerns, or needs.   Patients upcoming visits:    Future Appointments   Date Time Provider Matt Butler   7/16/2021  3:40 PM MD SONALI PollackF BS AMB   1/3/2022  1:40 PM Pepe Ayala MD CAVSF BS AMB

## 2021-07-16 ENCOUNTER — OFFICE VISIT (OUTPATIENT)
Dept: CARDIOLOGY CLINIC | Age: 61
End: 2021-07-16
Payer: COMMERCIAL

## 2021-07-16 VITALS
HEIGHT: 73 IN | DIASTOLIC BLOOD PRESSURE: 82 MMHG | SYSTOLIC BLOOD PRESSURE: 130 MMHG | RESPIRATION RATE: 16 BRPM | BODY MASS INDEX: 26.29 KG/M2 | OXYGEN SATURATION: 97 % | WEIGHT: 198.4 LBS | HEART RATE: 72 BPM

## 2021-07-16 DIAGNOSIS — I48.92 ATRIAL FLUTTER WITH RAPID VENTRICULAR RESPONSE (HCC): Primary | ICD-10-CM

## 2021-07-16 PROCEDURE — 99215 OFFICE O/P EST HI 40 MIN: CPT | Performed by: INTERNAL MEDICINE

## 2021-07-16 NOTE — PROGRESS NOTES
HISTORY OF PRESENTING ILLNESS      Trisha El is a 62 y.o. male who was seen in the ER for atrial flutter with RVR and spontaneously converted to NSR on IV diltiazem. He had echocardiogram which showed a normal LV function and was discharged home with Diltiazem and daily aspirin. He is a [employer]ADsVASC 0. PAST MEDICAL HISTORY     Past Medical History:   Diagnosis Date    Cancer [facility])     testicular & prostate           PAST SURGICAL HISTORY     Past Surgical History:   Procedure Laterality Date    HX OTHER SURGICAL      testicular orchectomy    HX OTHER SURGICAL      prostatectomy    HX OTHER SURGICAL Left     removal of lipoma on arm          ALLERGIES     Allergies   Allergen Reactions    Minocycline Other (comments)     Made patient feel \"achy and lethargic\"          FAMILY HISTORY     Family History   Problem Relation Age of Onset    Heart Disease Mother     negative for cardiac disease       SOCIAL HISTORY     Social History     Socioeconomic History    Marital status:      Spouse name: Not on file    Number of children: Not on file    Years of education: Not on file    Highest education level: Not on file   Tobacco Use    Smoking status: Never Smoker    Smokeless tobacco: Never Used   Substance and Sexual Activity    Alcohol use: Yes     Alcohol/week: 5.8 standard drinks     Types: 7 Cans of beer per week    Drug use: No     Social Determinants of Health     Financial Resource Strain:     Difficulty of Paying Living Expenses:    Food Insecurity:     Worried About Running Out of Food in the Last Year:     [address] in the Last Year:    Transportation Needs:     Lack of Transportation (Medical):      Lack of Transportation (Non-Medical):    Physical Activity:     Days of Exercise per Week:     Minutes of Exercise per Session:    Stress:     Feeling of Stress :    Social Connections:     Frequency of Communication with Friends and Family:     Frequency of Social Gatherings with Friends and Family:     Attends Rastafarian Services:     Active Member of Clubs or Organizations:     Attends Club or Organization Meetings:     Marital Status:          MEDICATIONS     Current Outpatient Medications   Medication Sig    ZINC PO Take  by mouth.  Multivitamins with Fluoride (MULTI-VITAMIN PO) Take  by mouth.  dilTIAZem ER (CARDIZEM CD) 120 mg capsule Take 1 Capsule by mouth daily. Indications: ventricular rate control in atrial fibrillation    aspirin delayed-release 81 mg tablet Take 1 Tablet by mouth daily for 30 days.  fluticasone (FLONASE) 50 mcg/actuation nasal spray 1 [place] by Both Nostrils route daily.  hydrocodone-acetaminophen (NORCO) 5-325 mg per tablet Take 1-2 tablets by mouth every four (4) hours as needed for Pain. (Patient not taking: Reported on 6/17/2021)    ondansetron (ZOFRAN ODT) 4 mg disintegrating tablet Take 1 tablet by mouth every eight (8) hours as needed for Nausea. (Patient not taking: Reported on 6/17/2021)     No current facility-administered medications for this visit. I have reviewed the nurses notes, vitals, problem list, allergy list, medical history, family, social history and medications. REVIEW OF SYMPTOMS      General: Pt denies excessive weight gain or loss. Pt is able to conduct ADL's  HEENT: Denies blurred vision, headaches, hearing loss, epistaxis and difficulty swallowing. Respiratory: Denies cough, congestion, shortness of breath, LORA, wheezing or stridor.   Cardiovascular: Denies precordial pain, palpitations, edema or PND  Gastrointestinal: Denies poor appetite, indigestion, abdominal pain or blood in stool  Genitourinary: Denies hematuria, dysuria, increased urinary frequency  Musculoskeletal: Denies joint pain or swelling from muscles or joints  Neurologic: Denies tremor, paresthesias, headache, or sensory motor disturbance  Psychiatric: Denies confusion, insomnia, depression  Integumentray: Denies rash, itching or ulcers. Hematologic: Denies easy bruising, bleeding       PHYSICAL EXAMINATION      Vitals: see vitals section  General: Well developed, in no acute distress. HEENT: No jaundice, oral mucosa moist, no oral ulcers  Neck: Supple, no stiffness, no lymphadenopathy, supple  Heart:  Normal S1/S2 negative S3 or S4. Regular, no murmur, gallop or rub, no jugular venous distention  Respiratory: Clear bilaterally x 4, no wheezing or rales  Abdomen:   Soft, non-tender, bowel sounds are active. Extremities:  No edema, normal cap refill, no cyanosis. Musculoskeletal: No clubbing, no deformities  Neuro: A&Ox3, speech clear, gait stable, cooperative, no focal neurologic deficits  Skin: Skin color is normal. No rashes or lesions. Non diaphoretic, moist.  Vascular: 2+ pulses symmetric in all extremities       DIAGNOSTIC DATA      EKG:     Visit Vitals  /82 (BP 1 Location: Left upper arm, BP Patient Position: Sitting)   Pulse 72   Resp 16   Ht 6' 1\" (1.854 m)   Wt 198 lb 6.4 oz (90 kg)   SpO2 97%   BMI 26.18 kg/m²        LABORATORY DATA      Lab Results   Component Value Date/Time    WBC 4.4 06/17/2021 10:01 AM    HGB 16.1 06/17/2021 10:01 AM    HCT 46.2 06/17/2021 10:01 AM    PLATELET 571 69/06/2687 10:01 AM    MCV 94.3 06/17/2021 10:01 AM      Lab Results   Component Value Date/Time    Sodium 140 06/17/2021 10:01 AM    Potassium 4.3 06/17/2021 10:01 AM    Chloride 103 06/17/2021 10:01 AM    CO2 26 06/17/2021 10:01 AM    Anion gap 11 06/17/2021 10:01 AM    Glucose 120 (H) 06/17/2021 10:01 AM    BUN 17 06/17/2021 10:01 AM    Creatinine 1.18 06/17/2021 10:01 AM    BUN/Creatinine ratio 14 06/17/2021 10:01 AM    GFR est AA >60 06/17/2021 10:01 AM    GFR est non-AA >60 06/17/2021 10:01 AM    Calcium 9.1 06/17/2021 10:01 AM    Bilirubin, total 0.7 06/17/2021 10:01 AM    Alk.  phosphatase 95 06/17/2021 10:01 AM    Protein, total 7.7 06/17/2021 10:01 AM    Albumin 4.3 06/17/2021 10:01 AM    Globulin 3.4 06/17/2021 10:01 AM    A-G Ratio 1.3 06/17/2021 10:01 AM    ALT (SGPT) 49 06/17/2021 10:01 AM           ASSESSMENT      1. Atrial flutter          PLAN     Plan for AFL ablation at [facility] with MAC. Plan to DC diltiazem post ablation. Evaluate for SVT as well. FOLLOW-UP       Thank you, Tara Angelo MD and Dr. Walter Bains for allowing me to participate in the care of this extraordinarily pleasant male. Please do not hesitate to contact me for further questions/concerns.          Shaji Chinchilla MD  Cardiac Electrophysiology / Cardiology    [address]  [address] [facility], [facility], [address]  [phone] / [phone] Fax   [phone] / [phone] Fax

## 2021-07-16 NOTE — LETTER
7/16/2021    Patient: Abdias Matthew. YOB: 1960   Date of Visit: 7/16/2021     Jesús Mahmood, [address] [place] [phone]  Via Fax: [phone]    Dear Jesús Mahmood MD,      Thank you for referring Mr. Abelino Guerrero to [facility] for evaluation. My notes for this consultation are attached. If you have questions, please do not hesitate to call me. I look forward to following your patient along with you.       Sincerely,    Renetta Hagan MD

## 2021-07-16 NOTE — PROGRESS NOTES
Room EP 1  Visit Vitals  /82 (BP 1 Location: Left upper arm, BP Patient Position: Sitting)   Pulse 72   Resp 16   Ht 6' 1\" (1.854 m)   Wt 198 lb 6.4 oz (90 kg)   SpO2 97%   BMI 26.18 kg/m²       Chest pain: no  Shortness of breath: no  Edema: no  Palpitations, Skipped beats, Rapid heartbeat: yes, feels it in his neck  Dizziness: when standing up to quickly or looking up at the ceiling  Fatigue:no    New diagnosis/Surgeries: no    ER/Hospitalizations: [facility] 6/17 AFL with RVR    Refills:no

## 2021-07-21 ENCOUNTER — TELEPHONE (OUTPATIENT)
Dept: CARDIOLOGY CLINIC | Age: 61
End: 2021-07-21

## 2021-07-21 DIAGNOSIS — Z01.812 PRE-PROCEDURE LAB EXAM: ICD-10-CM

## 2021-07-21 DIAGNOSIS — I48.92 ATRIAL FLUTTER WITH RAPID VENTRICULAR RESPONSE (HCC): Primary | ICD-10-CM

## 2021-07-21 NOTE — TELEPHONE ENCOUNTER
Received call from patient, ID verified using two patient identifiers. Patient scheduled for a Aflutter ablation with Dr. John Hand at [facility] on Thursday, 8/26/21 at 10:00 am (patient to arrive at 8:00 am). Patient needs to confirm date with his wife but will notify us by tomorrow if date needs to be changed. Reviewed pre-procedure instructions with patient and time allowed for questions. Instructions to be mailed once date is confirmed to the address confirmed on file. Patient verbalized understanding and will call with any other questions.

## 2021-07-22 NOTE — TELEPHONE ENCOUNTER
Patient called to confirm the ablation appointment scheduled on August 26th at [place] [address].    PHONE: [phone]

## 2021-07-26 ENCOUNTER — HOSPITAL ENCOUNTER (EMERGENCY)
Age: 61
Discharge: HOME | End: 2021-07-26
Attending: STUDENT IN AN ORGANIZED HEALTH CARE EDUCATION/TRAINING PROGRAM
Payer: COMMERCIAL

## 2021-07-26 VITALS
WEIGHT: 194 LBS | RESPIRATION RATE: 16 BRPM | HEART RATE: 76 BPM | TEMPERATURE: 97.8 F | OXYGEN SATURATION: 96 % | SYSTOLIC BLOOD PRESSURE: 114 MMHG | BODY MASS INDEX: 25.71 KG/M2 | HEIGHT: 73 IN | DIASTOLIC BLOOD PRESSURE: 70 MMHG

## 2021-07-26 DIAGNOSIS — I48.0 PAROXYSMAL ATRIAL FIBRILLATION WITH RVR (HCC): Primary | ICD-10-CM

## 2021-07-26 LAB
ALBUMIN SERPL-MCNC: 3.9 G/DL (ref 3.5–5)
ALBUMIN/GLOB SERPL: 1.2 {RATIO} (ref 1.1–2.2)
ALP SERPL-CCNC: 82 U/L (ref 45–117)
ALT SERPL-CCNC: 37 U/L (ref 12–78)
ANION GAP SERPL CALC-SCNC: 4 MMOL/L (ref 5–15)
AST SERPL-CCNC: 26 U/L (ref 15–37)
BASOPHILS # BLD: 0 K/UL (ref 0–0.1)
BASOPHILS NFR BLD: 0 % (ref 0–1)
BILIRUB SERPL-MCNC: 0.8 MG/DL (ref 0.2–1)
BUN SERPL-MCNC: 17 MG/DL (ref 6–20)
BUN/CREAT SERPL: 17 (ref 12–20)
CALCIUM SERPL-MCNC: 8.6 MG/DL (ref 8.5–10.1)
CHLORIDE SERPL-SCNC: 108 MMOL/L (ref 97–108)
CO2 SERPL-SCNC: 27 MMOL/L (ref 21–32)
CREAT SERPL-MCNC: 1 MG/DL (ref 0.7–1.3)
DIFFERENTIAL METHOD BLD: ABNORMAL
EOSINOPHIL # BLD: 0 K/UL (ref 0–0.4)
EOSINOPHIL NFR BLD: 1 % (ref 0–7)
ERYTHROCYTE [DISTWIDTH] IN BLOOD BY AUTOMATED COUNT: 11.7 % (ref 11.5–14.5)
GLOBULIN SER CALC-MCNC: 3.3 G/DL (ref 2–4)
GLUCOSE SERPL-MCNC: 104 MG/DL (ref 65–100)
HCT VFR BLD AUTO: 43.7 % (ref 36.6–50.3)
HGB BLD-MCNC: 15.5 G/DL (ref 12.1–17)
IMM GRANULOCYTES # BLD AUTO: 0 K/UL (ref 0–0.04)
IMM GRANULOCYTES NFR BLD AUTO: 0 % (ref 0–0.5)
LYMPHOCYTES # BLD: 0.8 K/UL (ref 0.8–3.5)
LYMPHOCYTES NFR BLD: 16 % (ref 12–49)
MCH RBC QN AUTO: 32.8 PG (ref 26–34)
MCHC RBC AUTO-ENTMCNC: 35.5 G/DL (ref 30–36.5)
MCV RBC AUTO: 92.6 FL (ref 80–99)
MONOCYTES # BLD: 0.4 K/UL (ref 0–1)
MONOCYTES NFR BLD: 7 % (ref 5–13)
NEUTS SEG # BLD: 4.1 K/UL (ref 1.8–8)
NEUTS SEG NFR BLD: 76 % (ref 32–75)
NRBC # BLD: 0 K/UL (ref 0–0.01)
NRBC BLD-RTO: 0 PER 100 WBC
PLATELET # BLD AUTO: 174 K/UL (ref 150–400)
PMV BLD AUTO: 10.5 FL (ref 8.9–12.9)
POTASSIUM SERPL-SCNC: 3.8 MMOL/L (ref 3.5–5.1)
PROT SERPL-MCNC: 7.2 G/DL (ref 6.4–8.2)
RBC # BLD AUTO: 4.72 M/UL (ref 4.1–5.7)
SODIUM SERPL-SCNC: 139 MMOL/L (ref 136–145)
TROPONIN I SERPL-MCNC: <0.05 NG/ML
WBC # BLD AUTO: 5.4 K/UL (ref 4.1–11.1)

## 2021-07-26 PROCEDURE — 99285 EMERGENCY DEPT VISIT HI MDM: CPT

## 2021-07-26 PROCEDURE — 84484 ASSAY OF TROPONIN QUANT: CPT

## 2021-07-26 PROCEDURE — 74011000250 HC RX REV CODE- 250: Performed by: STUDENT IN AN ORGANIZED HEALTH CARE EDUCATION/TRAINING PROGRAM

## 2021-07-26 PROCEDURE — 93005 ELECTROCARDIOGRAM TRACING: CPT

## 2021-07-26 PROCEDURE — 85025 COMPLETE CBC W/AUTO DIFF WBC: CPT

## 2021-07-26 PROCEDURE — 74011250636 HC RX REV CODE- 250/636: Performed by: STUDENT IN AN ORGANIZED HEALTH CARE EDUCATION/TRAINING PROGRAM

## 2021-07-26 PROCEDURE — 74011250637 HC RX REV CODE- 250/637: Performed by: STUDENT IN AN ORGANIZED HEALTH CARE EDUCATION/TRAINING PROGRAM

## 2021-07-26 PROCEDURE — 80053 COMPREHEN METABOLIC PANEL: CPT

## 2021-07-26 PROCEDURE — 96361 HYDRATE IV INFUSION ADD-ON: CPT

## 2021-07-26 PROCEDURE — 36415 COLL VENOUS BLD VENIPUNCTURE: CPT

## 2021-07-26 PROCEDURE — 96374 THER/PROPH/DIAG INJ IV PUSH: CPT

## 2021-07-26 RX ORDER — DILTIAZEM HYDROCHLORIDE 60 MG/1
120 CAPSULE, EXTENDED RELEASE ORAL
Status: COMPLETED | OUTPATIENT
Start: 2021-07-26 | End: 2021-07-26

## 2021-07-26 RX ORDER — DILTIAZEM HYDROCHLORIDE 5 MG/ML
0.25 INJECTION INTRAVENOUS ONCE
Status: COMPLETED | OUTPATIENT
Start: 2021-07-26 | End: 2021-07-26

## 2021-07-26 RX ADMIN — SODIUM CHLORIDE 1000 ML: 9 INJECTION, SOLUTION INTRAVENOUS at 13:39

## 2021-07-26 RX ADMIN — DILTIAZEM HYDROCHLORIDE 22 MG: 5 INJECTION INTRAVENOUS at 13:35

## 2021-07-26 RX ADMIN — DILTIAZEM HYDROCHLORIDE 120 MG: 60 CAPSULE, EXTENDED RELEASE ORAL at 15:19

## 2021-07-26 NOTE — ED PROVIDER NOTES
Patient is a 78-year-old male presented emergency department with palpitations. Patient states that last night he started to have palpitations sensation that his heart was racing his smart watch stated that his heart rate was in the 120s. Patient says that he went to bed last night slept fairly well and then this morning he felt like his heart was racing again he took Cardizem without relief. Patient was recently seen by Dr. John Hand with cardiology for new onset A. fib/flutter with a scheduled ablation in August. Patient is currently not on anticoagulation he denies any chest pain with symptoms however he has been experiencing some dizziness with this episode today. Patient says that since his new diagnosis he only drinks 1 beer per day his caffeine consumption consists of 1 cup of tea in the morning only. Patient says that he [place] however been consuming large amounts of water to stay hydrated and yesterday went on a 2 to 3 mile bike ride. Past Medical History:   Diagnosis Date    Cancer [facility])     testicular & prostate       Past Surgical History:   Procedure Laterality Date    HX OTHER SURGICAL      testicular orchectomy    HX OTHER SURGICAL      prostatectomy    HX OTHER SURGICAL Left     removal of lipoma on arm         Family History:   Problem Relation Age of Onset    Heart Disease Mother        Social History     Socioeconomic History    Marital status:      Spouse name: Not on file    Number of children: Not on file    Years of education: Not on file    Highest education level: Not on file   Occupational History    Not on file   Tobacco Use    Smoking status: Never Smoker    Smokeless tobacco: Never Used   Substance and Sexual Activity    Alcohol use:  Yes     Alcohol/week: 5.8 standard drinks     Types: 7 Cans of beer per week    Drug use: No    Sexual activity: Not on file   Other Topics Concern    Not on file   Social History Narrative    Not on file     Social Determinants of Health     Financial Resource Strain:     Difficulty of Paying Living Expenses:    Food Insecurity:     Worried About Running Out of Food in the Last Year:     [address] in the Last Year:    Transportation Needs:     Lack of Transportation (Medical):  Lack of Transportation (Non-Medical):    Physical Activity:     Days of Exercise per Week:     Minutes of Exercise per Session:    Stress:     Feeling of Stress :    Social Connections:     Frequency of Communication with Friends and Family:     Frequency of Social Gatherings with Friends and Family:     Attends [employer] Services:     Active Member of Clubs or Organizations:     Attends Club or Organization Meetings:     Marital Status:    Intimate Partner Violence:     Fear of Current or Ex-Partner:     Emotionally Abused:     Physically Abused:     Sexually Abused: ALLERGIES: Minocycline    Review of Systems   Cardiovascular: Positive for palpitations. Negative for chest pain. Neurological: Positive for dizziness. All other systems reviewed and are negative. Vitals:    07/26/21 1302   BP: (!) 139/97   Pulse: (!) 149   Resp: 18   Temp: 97.8 °F (36.6 °C)   SpO2: 100%   Weight: 88 kg (194 lb)   Height: 6' 1\" (1.854 m)            Physical Exam  Vitals and nursing note reviewed. Constitutional:       Appearance: Normal appearance. HENT:      Head: Normocephalic and atraumatic. Eyes:      Extraocular Movements: Extraocular movements intact. Pupils: Pupils are equal, round, and reactive to light. Cardiovascular:      Rate and Rhythm: Tachycardia present. Rhythm irregular. Pulmonary:      Effort: Pulmonary effort is normal.      Breath sounds: Normal breath sounds. Abdominal:      General: Abdomen is flat. Musculoskeletal:         General: Normal range of motion. Skin:     General: Skin is warm. Neurological:      General: No focal deficit present.       Mental Status: He is alert and oriented to person, place, and time. Psychiatric:         Mood and Affect: Mood normal.         Behavior: Behavior normal.          MDM  Number of Diagnoses or Management Options  Diagnosis management comments: A/P: Proximal A. fib/flutter with RVR. 31-year-old male presenting with new onset A. fib/flutter recently seen by cardiology today symptomatic with rates in the 130s will check labs, IV fluid bolus, Cardizem drip, Cardizem bolus. Patient has a [id number] 2 score of 0. Procedures    ED EKG interpretation:  Rhythm: atrial flutter 137; and irregular. Rate (approx.): 137; Axis: normal; QRS interval: normal ; ST/T wave: non-specific changes; in  Lead: V4 ; Other findings: abnormal ekg. EKG documented by Camila Austin MD,     Patient received diltiazem bolus 0.25 mg/kg patient's rate adequately controlled with a rate of 78 subsequently patient went into a normal sinus rhythm spoke to Dr. Matthew Fox and cardiology regarding having patient rate controlled switching over to 120 mg p.o. now extended release patient be discharged with follow-up with Dr. Matthew Fox.

## 2021-07-26 NOTE — ED NOTES
Pt triaged in 22; charge RN made aware pt needs monitor room at this time due to c.c. and heart rate.

## 2021-07-26 NOTE — ED TRIAGE NOTES
Pt arrives with the c.c. of heart palpitations that started last night, pt reports has seen Dr Kristopher Agosto in the last two weeks for new onset aflutter, pt reports does take diltiazam and took it this morning, pt states he felt like heart was palpitation and experienced throbbing sensation in neck with some dizziness.

## 2021-07-26 NOTE — ED NOTES
Pt responded well to Diltiazem. HR decreased from 144 to 70s. Remains in [address]. No ectopy noted. BP stable. Will con't to monitor.

## 2021-07-27 ENCOUNTER — TELEPHONE (OUTPATIENT)
Dept: CARDIOLOGY CLINIC | Age: 61
End: 2021-07-27

## 2021-07-27 ENCOUNTER — HOSPITAL ENCOUNTER (OUTPATIENT)
Age: 61
Setting detail: OBSERVATION
Discharge: HOME | End: 2021-07-29
Attending: EMERGENCY MEDICINE | Admitting: INTERNAL MEDICINE
Payer: COMMERCIAL

## 2021-07-27 DIAGNOSIS — I48.92 ATRIAL FLUTTER, UNSPECIFIED TYPE (HCC): ICD-10-CM

## 2021-07-27 DIAGNOSIS — I48.0 PAROXYSMAL ATRIAL FIBRILLATION WITH RVR (HCC): Primary | ICD-10-CM

## 2021-07-27 LAB
ALBUMIN SERPL-MCNC: 3.9 G/DL (ref 3.5–5)
ALBUMIN/GLOB SERPL: 1.2 {RATIO} (ref 1.1–2.2)
ALP SERPL-CCNC: 76 U/L (ref 45–117)
ALT SERPL-CCNC: 35 U/L (ref 12–78)
ANION GAP SERPL CALC-SCNC: 1 MMOL/L (ref 5–15)
AST SERPL-CCNC: 23 U/L (ref 15–37)
ATRIAL RATE: 280 BPM
ATRIAL RATE: 77 BPM
BASOPHILS # BLD: 0 K/UL (ref 0–0.1)
BASOPHILS NFR BLD: 0 % (ref 0–1)
BILIRUB SERPL-MCNC: 0.6 MG/DL (ref 0.2–1)
BUN SERPL-MCNC: 14 MG/DL (ref 6–20)
BUN/CREAT SERPL: 15 (ref 12–20)
CALCIUM SERPL-MCNC: 8.4 MG/DL (ref 8.5–10.1)
CALCULATED P AXIS, ECG09: 49 DEGREES
CALCULATED P AXIS, ECG09: 52 DEGREES
CALCULATED R AXIS, ECG10: -33 DEGREES
CALCULATED R AXIS, ECG10: -49 DEGREES
CALCULATED T AXIS, ECG11: 41 DEGREES
CALCULATED T AXIS, ECG11: 42 DEGREES
CHLORIDE SERPL-SCNC: 109 MMOL/L (ref 97–108)
CO2 SERPL-SCNC: 29 MMOL/L (ref 21–32)
CREAT SERPL-MCNC: 0.91 MG/DL (ref 0.7–1.3)
DIAGNOSIS, 93000: NORMAL
DIAGNOSIS, 93000: NORMAL
DIFFERENTIAL METHOD BLD: NORMAL
EOSINOPHIL # BLD: 0.1 K/UL (ref 0–0.4)
EOSINOPHIL NFR BLD: 1 % (ref 0–7)
ERYTHROCYTE [DISTWIDTH] IN BLOOD BY AUTOMATED COUNT: 11.9 % (ref 11.5–14.5)
GLOBULIN SER CALC-MCNC: 3.2 G/DL (ref 2–4)
GLUCOSE SERPL-MCNC: 161 MG/DL (ref 65–100)
HCT VFR BLD AUTO: 44 % (ref 36.6–50.3)
HGB BLD-MCNC: 15.5 G/DL (ref 12.1–17)
IMM GRANULOCYTES # BLD AUTO: 0 K/UL (ref 0–0.04)
IMM GRANULOCYTES NFR BLD AUTO: 0 % (ref 0–0.5)
LYMPHOCYTES # BLD: 1.2 K/UL (ref 0.8–3.5)
LYMPHOCYTES NFR BLD: 18 % (ref 12–49)
MAGNESIUM SERPL-MCNC: 2.3 MG/DL (ref 1.6–2.4)
MCH RBC QN AUTO: 32.8 PG (ref 26–34)
MCHC RBC AUTO-ENTMCNC: 35.2 G/DL (ref 30–36.5)
MCV RBC AUTO: 93.2 FL (ref 80–99)
MONOCYTES # BLD: 0.4 K/UL (ref 0–1)
MONOCYTES NFR BLD: 6 % (ref 5–13)
NEUTS SEG # BLD: 5.1 K/UL (ref 1.8–8)
NEUTS SEG NFR BLD: 75 % (ref 32–75)
NRBC # BLD: 0 K/UL (ref 0–0.01)
NRBC BLD-RTO: 0 PER 100 WBC
P-R INTERVAL, ECG05: 182 MS
PLATELET # BLD AUTO: 173 K/UL (ref 150–400)
PMV BLD AUTO: 10.4 FL (ref 8.9–12.9)
POTASSIUM SERPL-SCNC: 4 MMOL/L (ref 3.5–5.1)
PROT SERPL-MCNC: 7.1 G/DL (ref 6.4–8.2)
Q-T INTERVAL, ECG07: 374 MS
Q-T INTERVAL, ECG07: 384 MS
QRS DURATION, ECG06: 100 MS
QRS DURATION, ECG06: 94 MS
QTC CALCULATION (BEZET), ECG08: 403 MS
QTC CALCULATION (BEZET), ECG08: 434 MS
RBC # BLD AUTO: 4.72 M/UL (ref 4.1–5.7)
SODIUM SERPL-SCNC: 139 MMOL/L (ref 136–145)
TROPONIN I SERPL-MCNC: <0.05 NG/ML
VENTRICULAR RATE, ECG03: 70 BPM
VENTRICULAR RATE, ECG03: 77 BPM
WBC # BLD AUTO: 6.8 K/UL (ref 4.1–11.1)

## 2021-07-27 PROCEDURE — 99221 1ST HOSP IP/OBS SF/LOW 40: CPT | Performed by: INTERNAL MEDICINE

## 2021-07-27 PROCEDURE — 99218 HC RM OBSERVATION: CPT

## 2021-07-27 PROCEDURE — 99285 EMERGENCY DEPT VISIT HI MDM: CPT

## 2021-07-27 PROCEDURE — 80053 COMPREHEN METABOLIC PANEL: CPT

## 2021-07-27 PROCEDURE — 93005 ELECTROCARDIOGRAM TRACING: CPT

## 2021-07-27 PROCEDURE — 85025 COMPLETE CBC W/AUTO DIFF WBC: CPT

## 2021-07-27 PROCEDURE — 84484 ASSAY OF TROPONIN QUANT: CPT

## 2021-07-27 PROCEDURE — 65270000029 HC RM PRIVATE

## 2021-07-27 PROCEDURE — 74011250636 HC RX REV CODE- 250/636: Performed by: STUDENT IN AN ORGANIZED HEALTH CARE EDUCATION/TRAINING PROGRAM

## 2021-07-27 PROCEDURE — 83735 ASSAY OF MAGNESIUM: CPT

## 2021-07-27 PROCEDURE — 36415 COLL VENOUS BLD VENIPUNCTURE: CPT

## 2021-07-27 RX ORDER — SODIUM CHLORIDE 0.9 % (FLUSH) 0.9 %
5-40 SYRINGE (ML) INJECTION AS NEEDED
Status: DISCONTINUED | OUTPATIENT
Start: 2021-07-27 | End: 2021-07-29 | Stop reason: HOSPADM

## 2021-07-27 RX ORDER — FLUTICASONE PROPIONATE 50 MCG
1 SPRAY, SUSPENSION (ML) NASAL DAILY
Status: DISCONTINUED | OUTPATIENT
Start: 2021-07-28 | End: 2021-07-29 | Stop reason: HOSPADM

## 2021-07-27 RX ORDER — SODIUM CHLORIDE 0.9 % (FLUSH) 0.9 %
5-40 SYRINGE (ML) INJECTION EVERY 8 HOURS
Status: DISCONTINUED | OUTPATIENT
Start: 2021-07-27 | End: 2021-07-29 | Stop reason: HOSPADM

## 2021-07-27 RX ORDER — DILTIAZEM HYDROCHLORIDE 120 MG/1
120 CAPSULE, COATED, EXTENDED RELEASE ORAL DAILY
Status: DISCONTINUED | OUTPATIENT
Start: 2021-07-28 | End: 2021-07-29 | Stop reason: HOSPADM

## 2021-07-27 RX ADMIN — Medication 10 ML: at 15:42

## 2021-07-27 RX ADMIN — SODIUM CHLORIDE 1000 ML: 9 INJECTION, SOLUTION INTRAVENOUS at 15:42

## 2021-07-27 NOTE — TELEPHONE ENCOUNTER
Returned patient call, ID verified using two patient identifiers. Advised patient that Doris Smith NP recommended he take an additional 120mg today to equal 360 mg and if he doesn't feel better to proceed to the emergency room. Patient states he is currently at the emergency room waiting to be evaluated. He will call back if needed.

## 2021-07-27 NOTE — ED NOTES
Pt presents to ED for rapid heart rate that began this morning. Pt states that he here yesterday for the same and was discharged with appt for ablation. Pt took double dose of diltiazem per cardiology instruction with no relief. HR in the 150s on arrival and pt has now converted and hr is in the 70s. Pt is a&ox4, denies cp, no sob, lung sounds clear. On the monitor x4, call bell in reach will continue to monitor. 1546: Cards Md paged regarding order for diltiazem drip as pt hr is in the 70s and bp 125/83.     1613: Per Dr. Duke Verde, Dilt drip to be held and administered if patient goes back into aflutter.

## 2021-07-27 NOTE — H&P
HISTORY OF PRESENTING ILLNESS      Khoi Mae. is a 62 y.o. male presenting to ER with recurrent atrial flutter once again. He was planned for ablation in August.        PAST MEDICAL HISTORY     Past Medical History:   Diagnosis Date    Cancer [facility])     testicular & prostate           PAST SURGICAL HISTORY     Past Surgical History:   Procedure Laterality Date    HX OTHER SURGICAL      testicular orchectomy    HX OTHER SURGICAL      prostatectomy    HX OTHER SURGICAL Left     removal of lipoma on arm          ALLERGIES     Allergies   Allergen Reactions    Minocycline Other (comments)     Made patient feel \"achy and lethargic\"          FAMILY HISTORY     Family History   Problem Relation Age of Onset    Heart Disease Mother     negative for cardiac disease       SOCIAL HISTORY     Social History     Socioeconomic History    Marital status:      Spouse name: Not on file    Number of children: Not on file    Years of education: Not on file    Highest education level: Not on file   Tobacco Use    Smoking status: Never Smoker    Smokeless tobacco: Never Used   Substance and Sexual Activity    Alcohol use: Yes     Alcohol/week: 5.8 standard drinks     Types: 7 Cans of beer per week    Drug use: No     Social Determinants of Health     Financial Resource Strain:     Difficulty of Paying Living Expenses:    Food Insecurity:     Worried About Running Out of Food in the Last Year:     [address] in the Last Year:    Transportation Needs:     Lack of Transportation (Medical):      Lack of Transportation (Non-Medical):    Physical Activity:     Days of Exercise per Week:     Minutes of Exercise per Session:    Stress:     Feeling of Stress :    Social Connections:     Frequency of Communication with Friends and Family:     Frequency of Social Gatherings with Friends and Family:     Attends Orthodox Services:     Active Member of Clubs or Organizations:     Attends [employer] or Organization Meetings:     Marital Status:          MEDICATIONS     Current Facility-Administered Medications   Medication Dose Route Frequency    sodium chloride (NS) flush 5-40 mL  5-40 mL IntraVENous Q8H    sodium chloride (NS) flush 5-40 mL  5-40 mL IntraVENous PRN    dilTIAZem ER (CARDIZEM CD) capsule 120 mg  120 mg Oral DAILY    fluticasone propionate (FLONASE) 50 mcg/actuation nasal spray 1 Spray  1 Spray Both Nostrils DAILY     Current Outpatient Medications   Medication Sig    therapeutic multivitamin (THERAGRAN) tablet Take 1 Tablet by mouth daily.  aspirin delayed-release 81 mg tablet Take 81 mg by mouth daily.  zinc 50 mg tab tablet Take 1 Dose by mouth daily.  dilTIAZem ER (CARDIZEM CD) 120 mg capsule Take 1 Capsule by mouth daily. Indications: ventricular rate control in atrial fibrillation    fluticasone (FLONASE) 50 mcg/actuation nasal spray 1 [place] by Both Nostrils route daily. I have reviewed the nurses notes, vitals, problem list, allergy list, medical history, family, social history and medications. REVIEW OF SYMPTOMS      General: Pt denies excessive weight gain or loss. Pt is able to conduct ADL's  HEENT: Denies blurred vision, headaches, hearing loss, epistaxis and difficulty swallowing. Respiratory: Denies cough, congestion, shortness of breath, LORA, wheezing or stridor. Cardiovascular: Denies precordial pain, palpitations, edema or PND  Gastrointestinal: Denies poor appetite, indigestion, abdominal pain or blood in stool  Genitourinary: Denies hematuria, dysuria, increased urinary frequency  Musculoskeletal: Denies joint pain or swelling from muscles or joints  Neurologic: Denies tremor, paresthesias, headache, or sensory motor disturbance  Psychiatric: Denies confusion, insomnia, depression  Integumentray: Denies rash, itching or ulcers.   Hematologic: Denies easy bruising, bleeding       PHYSICAL EXAMINATION      Vitals: see vitals section  General: Well developed, in no acute distress. HEENT: No jaundice, oral mucosa moist, no oral ulcers  Neck: Supple, no stiffness, no lymphadenopathy, supple  Heart:  irreg irreg, no murmur, gallop or rub, no jugular venous distention  Respiratory: Clear bilaterally x 4, no wheezing or rales  Abdomen:   Soft, non-tender, bowel sounds are active. Extremities:  No edema, normal cap refill, no cyanosis. Musculoskeletal: No clubbing, no deformities  Neuro: A&Ox3, speech clear, gait stable, cooperative, no focal neurologic deficits  Skin: Skin color is normal. No rashes or lesions. Non diaphoretic, moist.  Vascular: 2+ pulses symmetric in all extremities       DIAGNOSTIC DATA      EKG:      LABORATORY DATA      Lab Results   Component Value Date/Time    WBC 6.8 07/27/2021 03:11 PM    HGB 15.5 07/27/2021 03:11 PM    HCT 44.0 07/27/2021 03:11 PM    PLATELET 162 43/07/0637 03:11 PM    MCV 93.2 07/27/2021 03:11 PM      Lab Results   Component Value Date/Time    Sodium 139 07/27/2021 03:11 PM    Potassium 4.0 07/27/2021 03:11 PM    Chloride 109 (H) 07/27/2021 03:11 PM    CO2 29 07/27/2021 03:11 PM    Anion gap 1 (L) 07/27/2021 03:11 PM    Glucose 161 (H) 07/27/2021 03:11 PM    BUN 14 07/27/2021 03:11 PM    Creatinine 0.91 07/27/2021 03:11 PM    BUN/Creatinine ratio 15 07/27/2021 03:11 PM    GFR est AA >60 07/27/2021 03:11 PM    GFR est non-AA >60 07/27/2021 03:11 PM    Calcium 8.4 (L) 07/27/2021 03:11 PM    Bilirubin, total 0.6 07/27/2021 03:11 PM    Alk. phosphatase 76 07/27/2021 03:11 PM    Protein, total 7.1 07/27/2021 03:11 PM    Albumin 3.9 07/27/2021 03:11 PM    Globulin 3.2 07/27/2021 03:11 PM    A-G Ratio 1.2 07/27/2021 03:11 PM    ALT (SGPT) 35 07/27/2021 03:11 PM           ASSESSMENT      1. Atrial flutter  2.   3.   4.   5.   6.         PLAN     Admit for flutter ablation given recurrent presentations to ER      Mack Junior MD  Cardiac Electrophysiology / Cardiology    [address], [place] [address] [address], [place], [facility]  [phone] / [phone] Fax   [phone] / [phone] Fax

## 2021-07-27 NOTE — ED PROVIDER NOTES
Patient is a 56-year-old male present emergency department with heart palpitations. Patient was seen and evaluated yesterday for A. fib/flutter with RVR was placed on the tilt bolus at that time patient converted back into a normal sinus rhythm was discharged given instructions that if he became tachycardic again to increase dose of home Cardizem 120 mg extended release x2. Patient says that he was fine until this morning when he went to go do something and he noticed he was having the palpitations again checked his iWatch and said that his heart rate was in the 130s. Patient took an additional dose of his Cardizem 120 mg called the cardiology office however due to it taking approximately 3 hours and still having symptoms patient comes back to the emergency department. After initial EKG showing a flutter with an increased rate patient was placed in her room and upon that time patient converted back into a normal sinus rhythm. Spoke to Dr. David Gimenez and cardiology who recommends that we will admit patient now and schedule ablation for likely Thursday. Past Medical History:   Diagnosis Date    Cancer [facility])     testicular & prostate       Past Surgical History:   Procedure Laterality Date    HX OTHER SURGICAL      testicular orchectomy    HX OTHER SURGICAL      prostatectomy    HX OTHER SURGICAL Left     removal of lipoma on arm         Family History:   Problem Relation Age of Onset    Heart Disease Mother        Social History     Socioeconomic History    Marital status:      Spouse name: Not on file    Number of children: Not on file    Years of education: Not on file    Highest education level: Not on file   Occupational History    Not on file   Tobacco Use    Smoking status: Never Smoker    Smokeless tobacco: Never Used   Substance and Sexual Activity    Alcohol use:  Yes     Alcohol/week: 5.8 standard drinks     Types: 7 Cans of beer per week    Drug use: No    Sexual activity: Not on file   Other Topics Concern    Not on file   Social History Narrative    Not on file     Social Determinants of Health     Financial Resource Strain:     Difficulty of Paying Living Expenses:    Food Insecurity:     Worried About Running Out of Food in the Last Year:     [address] in the Last Year:    Transportation Needs:     Lack of Transportation (Medical):  Lack of Transportation (Non-Medical):    Physical Activity:     Days of Exercise per Week:     Minutes of Exercise per Session:    Stress:     Feeling of Stress :    Social Connections:     Frequency of Communication with Friends and Family:     Frequency of Social Gatherings with Friends and Family:     Attends Jehov[employer]'s witness Services:     Active Member of Clubs or Organizations:     Attends Club or Organization Meetings:     Marital Status:    Intimate Partner Violence:     Fear of Current or Ex-Partner:     Emotionally Abused:     Physically Abused:     Sexually Abused: ALLERGIES: Minocycline    Review of Systems   Cardiovascular: Positive for palpitations. All other systems reviewed and are negative. Vitals:    07/27/21 1315 07/27/21 1503 07/27/21 1537   BP: (!) 130/95 125/83    Pulse: (!) 152 84    Resp: 18 20    Temp: 97.7 °F (36.5 °C)  98.3 °F (36.8 °C)   SpO2: 96% 96%    Weight: 87.7 kg (193 lb 5.5 oz)     Height: 6' 1\" (1.854 m)              Physical Exam  Vitals and nursing note reviewed. Constitutional:       Appearance: Normal appearance. HENT:      Head: Normocephalic and atraumatic. Eyes:      Extraocular Movements: Extraocular movements intact. Pupils: Pupils are equal, round, and reactive to light. Cardiovascular:      Rate and Rhythm: Tachycardia present. Rhythm irregular. Comments: Patient spontaneously converted to normal sinus rhythm after being roomed  Pulmonary:      Effort: Pulmonary effort is normal.      Breath sounds: Normal breath sounds.    Abdominal:      General: Abdomen is flat. Musculoskeletal:         General: Normal range of motion. Cervical back: Normal range of motion and neck supple. Skin:     General: Skin is warm and dry. Neurological:      General: No focal deficit present. Mental Status: He is alert and oriented to person, place, and time. Psychiatric:         Mood and Affect: Mood normal.         Behavior: Behavior normal.          MDM  Number of Diagnoses or Management Options  Paroxysmal atrial fibrillation with RVR (HCC)  Diagnosis management comments: A/P: A. fib/flutter with RVR proximal.  55-year-old male presenting with episode of a flutter with RVR now converted back to normal sinus rhythm due to patient having recurrent episodes patient require admission now for scheduled ablation. Amount and/or Complexity of Data Reviewed  Clinical lab tests: ordered and reviewed  Tests in the radiology section of CPT®: reviewed           Procedures    Perfect Serve Consult for Admission  5:28 PM    ED Room Number: ER03/03  Patient Name and age:  Krystal Maynard 62 y.o.  male  Working Diagnosis:   1. Paroxysmal atrial fibrillation with RVR ([facility] [address]        COVID-19 Suspicion:  no  Sepsis present:  no  Reassessment needed: no  Code Status:  Full Code  Readmission: no  Isolation Requirements:  no  Recommended Level of Care:  telemetry  Department:[facility] ED - [phone]  Other:      ED EKG interpretation:  Rhythm: atrial flutter w rvr; and irregular. Rate (approx.): 122; Axis: normal; QRS interval: normal ; ST/T wave: normal; in  Lead: II ; Other findings: abnormal ekg.    EKG documented by Joshua Mcintosh MD,

## 2021-07-27 NOTE — TELEPHONE ENCOUNTER
Spoke with Dr. Gloria Finley:  He confirmed to take 2nd dose, give it about an hour to see if his HR slows down. If not, or having other s/s, head to ED after that. Pt should be seen in clinic this PM if possible. Spoke with EP staff,  Scheduled for OV 7/28 w Dr. Sally Becerra. They needed to speak with him about ablation, so will contact patient.

## 2021-07-27 NOTE — TELEPHONE ENCOUNTER
Returned patient call, ID verified using two patient identifiers. Notified patient of Dr. Shakira Anderson recommendations below:    \"Spoke with Dr. Lindsay Law:  He confirmed to take 2nd dose, give it about an hour to see if his HR slows down. If not, or having other s/s, head to ED after that. Pt should be seen in clinic this PM if possible.     Spoke with EP staff,  Scheduled for OV 7/28 w Dr. Xiomara Lara. They needed to speak with him about ablation, so will contact patient. \"    Confirmed patient's appointment for tomorrow with Dr. Xiomara Lara. Discussed patient's symptoms and when to go back to the emergency room. Patient verbalized understanding and will call with any other questions.       Future Appointments   Date Time Provider Matt Butler   7/28/2021 10:00 AM MD JOSÉ Flores BS AMB   1/3/2022  1:40 PM Jena Ayala MD [facility] BS AMB

## 2021-07-27 NOTE — TELEPHONE ENCOUNTER
Received call from patient, ID verified using two patient identifiers. Patient calling because it's been a little over one hour and his heart rate is still 140's-150's. His BP is 127/89. He is asking if he should go to the emergency room. Patient states his symptoms haven't worsened. Advised patient that I will reach out to Dr. Juan Spencer and Romaine Hawkins, NP to see if they have any further recommendations. Instructed patient to proceed to the emergency room if his symptoms worsen. I will call him back shortly with instructions. Patient verbalized understanding and will call with any other questions.

## 2021-07-27 NOTE — TELEPHONE ENCOUNTER
Pt called in,   Was at ED yesterday for AF w RVR. Once he got the IV, he converted down quickly. Came home, fine all evening. 10:30pm went to bed, slept through night. Felt well this am, took meds 120 mg Dilt, ASA. 20 min later started with rapid pulse in neck. Pulse was 140's (still is). Pt stated ED Dr said to take 2nd dose of 120 mg Dilt. He wanted to get confirmation with us prior to taking 2nd dose. Pt asking how quickly does Dilt work?     C/b number [phone]

## 2021-07-27 NOTE — Clinical Note
Patient transported with a Registered Nurse, [address] [place] [address] / Patient Care [facility] and Monitor.

## 2021-07-27 NOTE — ED TRIAGE NOTES
Patient presents to ED for c/o rapid heart rate that began this morning around 0900. States he was seen yesterday for the same. Reports that he did take two doses of diltiazem this morning with no relief.      Is scheduled for ablation on 8/26

## 2021-07-27 NOTE — Clinical Note
Right femoral vein. Accessed successfully. using fluoro guidance.  X2 Include Location In Plan?: No Detail Level: Generalized

## 2021-07-28 LAB
ATRIAL RATE: 75 BPM
CALCULATED P AXIS, ECG09: 51 DEGREES
CALCULATED R AXIS, ECG10: -49 DEGREES
CALCULATED T AXIS, ECG11: 38 DEGREES
DIAGNOSIS, 93000: NORMAL
P-R INTERVAL, ECG05: 168 MS
Q-T INTERVAL, ECG07: 360 MS
QRS DURATION, ECG06: 94 MS
QTC CALCULATION (BEZET), ECG08: 402 MS
VENTRICULAR RATE, ECG03: 75 BPM

## 2021-07-28 PROCEDURE — 74011250637 HC RX REV CODE- 250/637: Performed by: INTERNAL MEDICINE

## 2021-07-28 PROCEDURE — 99218 HC RM OBSERVATION: CPT

## 2021-07-28 PROCEDURE — 99233 SBSQ HOSP IP/OBS HIGH 50: CPT | Performed by: INTERNAL MEDICINE

## 2021-07-28 PROCEDURE — 65270000029 HC RM PRIVATE

## 2021-07-28 RX ORDER — ASPIRIN 81 MG/1
81 TABLET ORAL DAILY
COMMUNITY
End: 2021-11-02

## 2021-07-28 RX ORDER — THERA TABS 400 MCG
1 TAB ORAL DAILY
COMMUNITY

## 2021-07-28 RX ADMIN — DILTIAZEM HYDROCHLORIDE 120 MG: 120 CAPSULE, COATED, EXTENDED RELEASE ORAL at 08:57

## 2021-07-28 NOTE — ED NOTES
Bedside and Verbal shift change report given to EMILY Toth RN (oncoming nurse) by LANA Goldman RN (offgoing nurse). Report included the following information SBAR, Kardex, ED Summary, Intake/Output, MAR, Recent Results and Cardiac Rhythm A.fib. Assumed care of patient. Patient resting in stretcher in low and locked position watching tv, call bell within reach. Introduced self as primary nurse. Discussed plan of care with patient. Opportunity to ask questions given. Patient denies any additional complaints at this time.

## 2021-07-28 NOTE — PROGRESS NOTES
Admission Medication Reconciliation:     Information obtained from:   Patient via interview in Er 16   RxQuery data available¹:  YES    Comments/Recommendations:   Patient able to confirm name, , allergies, and preferred pharmacy  Updated PTA medication list  The patient took two dose of diltiazem yesterday at the direction of his cardiologist for an elevated heart rate. The extra dose of diltiazem was not helpful so the patient came to the ED. ¹RxQuery pharmacy benefit data reflects medications filled and processed through the patient's insurance, however   this data does NOT capture whether the medication was picked up or is currently being taken by the patient. Prior to Admission Medications   Prescriptions Last Dose Informant Taking?   aspirin delayed-release 81 mg tablet 2021 at Unknown time Self Yes   Sig: Take 81 mg by mouth daily. dilTIAZem ER (CARDIZEM CD) 120 mg capsule 2021 at Unknown time Self Yes   Sig: Take 1 Capsule by mouth daily. Indications: ventricular rate control in atrial fibrillation   fluticasone (FLONASE) 50 mcg/actuation nasal spray 2021 at Unknown time Self Yes   [place] by Both Nostrils route daily. therapeutic multivitamin (THERAGRAN) tablet 2021 at Unknown time Self Yes   Sig: Take 1 Tablet by mouth daily. zinc 50 mg tab tablet 2021 at Unknown time Self Yes   Sig: Take 1 Dose by mouth daily. Facility-Administered Medications: None         Please contact the main inpatient pharmacy with any questions or concerns at [phone] and we will direct you to the clinical pharmacist covering this patient's care while in-house.    Sohail Storey, PharmD, BCPS

## 2021-07-28 NOTE — ED NOTES
Bedside and Verbal shift change report given to [facility] (oncoming nurse) by Chanel LONGORIA (offgoing nurse). Report included the following information SBAR, Kardex, ED Summary, Intake/Output, Recent Results and Cardiac Rhythm PAF.

## 2021-07-28 NOTE — ED NOTES
Bedside and Verbal shift change report given to Chanel LONGORIA (oncoming nurse) by [facility] (offgoing nurse). Report included the following information SBAR, ED Summary, MAR and Recent Results.

## 2021-07-28 NOTE — ED NOTES
1120- Pt's HR in 150's, BP stable, pt asymptomatic. HR fluctuating from  at this time. Notified Lizzy Bone via [employer].   1132- Pt converted to NSR.   1134- Pt now sinus arrhythmia @ 85 BPM.

## 2021-07-28 NOTE — ED NOTES
Bedside and Verbal shift change report given to ABDELRAHMAN Bailey RN (oncoming nurse) by EMILY Jacobson RN (offgoing nurse). Report included the following information SBAR, Kardex, ED Summary, MAR, Recent Results and Cardiac Rhythm A.fib.

## 2021-07-28 NOTE — PROGRESS NOTES
7/28/2021  11:01 AM  CM completed assessment w/ pt in person, CM wore mask at all times. Charted demographics verified, pt lives w/ spouse Yuliana Gill (C) [phone] in 1 story home, there are no entry steps through the rear. At baseline pt is ambulatory, iADLs, drives, wife can assist .  Reason for Admission:  Emergent for Atrial Flutter  Pt is a 61 yo  male who presents to [facility] ED c/o palpitations                   RUR Score:     9 % Low Risk of Readmission                  Plan for utilizing home health:     No history, pt is not homebound, independent  w./ all ADLs,   DME: None  Rx: pt uses [facility] [employer], fully covered  COVID Vaccine Hx: unvaccinated     PCP: First and Last name:  Saurav Yip MD     Name of Practice:    Are you a current patient: Yes/No: Yes   Approximate date of last visit: 60 days   Can you participate in a virtual visit with your PCP: Yes                  Current Advanced Directive/Advance Care Plan: Full Code  HCDM spouse Inocencio Cm [phone]    Healthcare Decision Maker:   Click here to complete [address] including selection of the Healthcare Decision Maker Relationship (ie \"Primary\")                             Transition of Care Plan:                    RUR 9%   LOS 1 Day  1. Hospital admission for medical management  2. plan for ablation Thursday 7/29 with Dr Rodo Lawson  3. CM to follow through for treatment/response  4. DC when stable to home  5. Outpatient f/u cardiology, PCP  6. Wife Yuliana Gill to transport  Care Management Interventions  PCP Verified by CM:  Yes Jerzy Banks MD, last visit 60 days)  Palliative Care Criteria Met (RRAT>21 & CHF Dx)?: No  Mode of Transport at Discharge: Self (spouse)  Physical Therapy Consult: No  Occupational Therapy Consult: No  Speech Therapy Consult: No  Current Support Network: Lives with Spouse, Own Home (pt lvies w/ spousein pvt residence, at baseline pt is ambulatory, iADls, drives)  Confirm Follow Up Transport: Self  Discharge Location  Discharge Placement: Home with outpatient services  Gary Mortimer, BS

## 2021-07-28 NOTE — PROGRESS NOTES
HISTORY OF PRESENTING ILLNESS      Converted to SR. Remains on diltiazem. PAST MEDICAL HISTORY     Past Medical History:   Diagnosis Date    Cancer [facility])     testicular & prostate           PAST SURGICAL HISTORY     Past Surgical History:   Procedure Laterality Date    HX OTHER SURGICAL      testicular orchectomy    HX OTHER SURGICAL      prostatectomy    HX OTHER SURGICAL Left     removal of lipoma on arm          ALLERGIES     Allergies   Allergen Reactions    Minocycline Other (comments)     Made patient feel \"achy and lethargic\"          FAMILY HISTORY     Family History   Problem Relation Age of Onset    Heart Disease Mother     negative for cardiac disease       SOCIAL HISTORY     Social History     Socioeconomic History    Marital status:      Spouse name: Not on file    Number of children: Not on file    Years of education: Not on file    Highest education level: Not on file   Tobacco Use    Smoking status: Never Smoker    Smokeless tobacco: Never Used   Substance and Sexual Activity    Alcohol use: Yes     Alcohol/week: 5.8 standard drinks     Types: 7 Cans of beer per week    Drug use: No     Social Determinants of Health     Financial Resource Strain:     Difficulty of Paying Living Expenses:    Food Insecurity:     Worried About Running Out of Food in the Last Year:     [address] in the Last Year:    Transportation Needs:     Lack of Transportation (Medical):      Lack of Transportation (Non-Medical):    Physical Activity:     Days of Exercise per Week:     Minutes of Exercise per Session:    Stress:     Feeling of Stress :    Social Connections:     Frequency of Communication with Friends and Family:     Frequency of Social Gatherings with Friends and Family:     Attends Oriental orthodox Services:     Active Member of Clubs or Organizations:     Attends Club or Organization Meetings:     Marital Status:          MEDICATIONS     Current Facility-Administered Medications   Medication Dose Route Frequency    sodium chloride (NS) flush 5-40 mL  5-40 mL IntraVENous Q8H    sodium chloride (NS) flush 5-40 mL  5-40 mL IntraVENous PRN    dilTIAZem ER (CARDIZEM CD) capsule 120 mg  120 mg Oral DAILY    fluticasone propionate (FLONASE) 50 mcg/actuation nasal spray 1 Spray  1 Spray Both Nostrils DAILY     Current Outpatient Medications   Medication Sig    therapeutic multivitamin (THERAGRAN) tablet Take 1 Tablet by mouth daily.  aspirin delayed-release 81 mg tablet Take 81 mg by mouth daily.  zinc 50 mg tab tablet Take 1 Dose by mouth daily.  dilTIAZem ER (CARDIZEM CD) 120 mg capsule Take 1 Capsule by mouth daily. Indications: ventricular rate control in atrial fibrillation    fluticasone (FLONASE) 50 mcg/actuation nasal spray 1 [place] by Both Nostrils route daily. I have reviewed the nurses notes, vitals, problem list, allergy list, medical history, family, social history and medications. REVIEW OF SYMPTOMS      General: Pt denies excessive weight gain or loss. Pt is able to conduct ADL's  HEENT: Denies blurred vision, headaches, hearing loss, epistaxis and difficulty swallowing. Respiratory: Denies cough, congestion, shortness of breath, LORA, wheezing or stridor. Cardiovascular: Denies precordial pain, palpitations, edema or PND  Gastrointestinal: Denies poor appetite, indigestion, abdominal pain or blood in stool  Genitourinary: Denies hematuria, dysuria, increased urinary frequency  Musculoskeletal: Denies joint pain or swelling from muscles or joints  Neurologic: Denies tremor, paresthesias, headache, or sensory motor disturbance  Psychiatric: Denies confusion, insomnia, depression  Integumentray: Denies rash, itching or ulcers. Hematologic: Denies easy bruising, bleeding       PHYSICAL EXAMINATION      Vitals: see vitals section  General: Well developed, in no acute distress.   HEENT: No jaundice, oral mucosa moist, no oral ulcers  Neck: Supple, no stiffness, no lymphadenopathy, supple  Heart:  irreg irreg, no murmur, gallop or rub, no jugular venous distention  Respiratory: Clear bilaterally x 4, no wheezing or rales  Abdomen:   Soft, non-tender, bowel sounds are active. Extremities:  No edema, normal cap refill, no cyanosis. Musculoskeletal: No clubbing, no deformities  Neuro: A&Ox3, speech clear, gait stable, cooperative, no focal neurologic deficits  Skin: Skin color is normal. No rashes or lesions. Non diaphoretic, moist.  Vascular: 2+ pulses symmetric in all extremities       DIAGNOSTIC DATA      EKG:     Visit Vitals  /73 (BP 1 Location: Left upper arm, BP Patient Position: At rest)   Pulse 75   Temp 97.7 °F (36.5 °C)   Resp 16   Ht 6' 1\" (1.854 m)   Wt 193 lb 5.5 oz (87.7 kg)   SpO2 98%   BMI 25.51 kg/m²        LABORATORY DATA      Lab Results   Component Value Date/Time    WBC 6.8 07/27/2021 03:11 PM    HGB 15.5 07/27/2021 03:11 PM    HCT 44.0 07/27/2021 03:11 PM    PLATELET 247 79/75/7701 03:11 PM    MCV 93.2 07/27/2021 03:11 PM      Lab Results   Component Value Date/Time    Sodium 139 07/27/2021 03:11 PM    Potassium 4.0 07/27/2021 03:11 PM    Chloride 109 (H) 07/27/2021 03:11 PM    CO2 29 07/27/2021 03:11 PM    Anion gap 1 (L) 07/27/2021 03:11 PM    Glucose 161 (H) 07/27/2021 03:11 PM    BUN 14 07/27/2021 03:11 PM    Creatinine 0.91 07/27/2021 03:11 PM    BUN/Creatinine ratio 15 07/27/2021 03:11 PM    GFR est AA >60 07/27/2021 03:11 PM    GFR est non-AA >60 07/27/2021 03:11 PM    Calcium 8.4 (L) 07/27/2021 03:11 PM    Bilirubin, total 0.6 07/27/2021 03:11 PM    Alk. phosphatase 76 07/27/2021 03:11 PM    Protein, total 7.1 07/27/2021 03:11 PM    Albumin 3.9 07/27/2021 03:11 PM    Globulin 3.2 07/27/2021 03:11 PM    A-G Ratio 1.2 07/27/2021 03:11 PM    ALT (SGPT) 35 07/27/2021 03:11 PM           ASSESSMENT      1. Atrial flutter          PLAN     Plan for AFL ablation tomorrow.            Harpreet Alejo MD  Cardiac Electrophysiology / Cardiology    [address]  [address], [address] [place] [address] Drive    Abebe Floresmiranda  [phone] / [phone] Fax   [phone] / [phone] Fax

## 2021-07-29 ENCOUNTER — APPOINTMENT (OUTPATIENT)
Dept: NON INVASIVE DIAGNOSTICS | Age: 61
End: 2021-07-29
Attending: INTERNAL MEDICINE
Payer: COMMERCIAL

## 2021-07-29 VITALS
SYSTOLIC BLOOD PRESSURE: 123 MMHG | HEIGHT: 73 IN | HEART RATE: 85 BPM | BODY MASS INDEX: 25.62 KG/M2 | OXYGEN SATURATION: 95 % | DIASTOLIC BLOOD PRESSURE: 77 MMHG | RESPIRATION RATE: 16 BRPM | TEMPERATURE: 97.7 F | WEIGHT: 193.34 LBS

## 2021-07-29 LAB
ATRIAL RATE: 308 BPM
CALCULATED P AXIS, ECG09: 71 DEGREES
CALCULATED R AXIS, ECG10: -21 DEGREES
CALCULATED T AXIS, ECG11: 38 DEGREES
DIAGNOSIS, 93000: NORMAL
Q-T INTERVAL, ECG07: 314 MS
QRS DURATION, ECG06: 88 MS
QTC CALCULATION (BEZET), ECG08: 447 MS
VENTRICULAR RATE, ECG03: 122 BPM

## 2021-07-29 PROCEDURE — C1760 CLOSURE DEV, VASC: HCPCS | Performed by: INTERNAL MEDICINE

## 2021-07-29 PROCEDURE — 93653 COMPRE EP EVAL TX SVT: CPT | Performed by: INTERNAL MEDICINE

## 2021-07-29 PROCEDURE — C1894 INTRO/SHEATH, NON-LASER: HCPCS | Performed by: INTERNAL MEDICINE

## 2021-07-29 PROCEDURE — 93613 INTRACARDIAC EPHYS 3D MAPG: CPT | Performed by: INTERNAL MEDICINE

## 2021-07-29 PROCEDURE — 99152 MOD SED SAME PHYS/QHP 5/>YRS: CPT | Performed by: INTERNAL MEDICINE

## 2021-07-29 PROCEDURE — 99153 MOD SED SAME PHYS/QHP EA: CPT | Performed by: INTERNAL MEDICINE

## 2021-07-29 PROCEDURE — 77030027107 HC PTCH EXT REF CARTO3 J&J -F: Performed by: INTERNAL MEDICINE

## 2021-07-29 PROCEDURE — 99218 HC RM OBSERVATION: CPT

## 2021-07-29 PROCEDURE — 77030029065 HC DRSG HEMO QCLOT ZMED -B: Performed by: INTERNAL MEDICINE

## 2021-07-29 PROCEDURE — 74011250636 HC RX REV CODE- 250/636: Performed by: INTERNAL MEDICINE

## 2021-07-29 PROCEDURE — 77030018729 HC ELECTRD DEFIB PAD CARD -B: Performed by: INTERNAL MEDICINE

## 2021-07-29 PROCEDURE — 93621 COMP EP EVL L PAC&REC C SINS: CPT | Performed by: INTERNAL MEDICINE

## 2021-07-29 PROCEDURE — 99238 HOSP IP/OBS DSCHRG MGMT 30/<: CPT | Performed by: INTERNAL MEDICINE

## 2021-07-29 PROCEDURE — C1732 CATH, EP, DIAG/ABL, 3D/VECT: HCPCS | Performed by: INTERNAL MEDICINE

## 2021-07-29 RX ORDER — HYDROCODONE BITARTRATE AND ACETAMINOPHEN 5; 325 MG/1; MG/1
1 TABLET ORAL
Status: DISCONTINUED | OUTPATIENT
Start: 2021-07-29 | End: 2021-07-29 | Stop reason: HOSPADM

## 2021-07-29 RX ORDER — ACETAMINOPHEN 325 MG/1
650 TABLET ORAL
Status: DISCONTINUED | OUTPATIENT
Start: 2021-07-29 | End: 2021-07-29 | Stop reason: HOSPADM

## 2021-07-29 RX ORDER — SODIUM CHLORIDE 0.9 % (FLUSH) 0.9 %
5-40 SYRINGE (ML) INJECTION AS NEEDED
Status: DISCONTINUED | OUTPATIENT
Start: 2021-07-29 | End: 2021-07-29 | Stop reason: HOSPADM

## 2021-07-29 RX ORDER — ONDANSETRON 2 MG/ML
4 INJECTION INTRAMUSCULAR; INTRAVENOUS
Status: DISCONTINUED | OUTPATIENT
Start: 2021-07-29 | End: 2021-07-29 | Stop reason: HOSPADM

## 2021-07-29 RX ORDER — DIPHENHYDRAMINE HYDROCHLORIDE 50 MG/ML
INJECTION, SOLUTION INTRAMUSCULAR; INTRAVENOUS AS NEEDED
Status: DISCONTINUED | OUTPATIENT
Start: 2021-07-29 | End: 2021-07-29 | Stop reason: HOSPADM

## 2021-07-29 RX ORDER — SODIUM CHLORIDE 0.9 % (FLUSH) 0.9 %
5-40 SYRINGE (ML) INJECTION EVERY 8 HOURS
Status: DISCONTINUED | OUTPATIENT
Start: 2021-07-29 | End: 2021-07-29 | Stop reason: HOSPADM

## 2021-07-29 RX ORDER — FENTANYL CITRATE 50 UG/ML
INJECTION, SOLUTION INTRAMUSCULAR; INTRAVENOUS AS NEEDED
Status: DISCONTINUED | OUTPATIENT
Start: 2021-07-29 | End: 2021-07-29 | Stop reason: HOSPADM

## 2021-07-29 RX ORDER — MIDAZOLAM HYDROCHLORIDE 1 MG/ML
INJECTION, SOLUTION INTRAMUSCULAR; INTRAVENOUS AS NEEDED
Status: DISCONTINUED | OUTPATIENT
Start: 2021-07-29 | End: 2021-07-29 | Stop reason: HOSPADM

## 2021-07-29 NOTE — PROGRESS NOTES
Problem: Falls - Risk of  Goal: *Absence of Falls  Description: Document Filemon Rivera Fall Risk and appropriate interventions in the flowsheet.   Outcome: Progressing Towards Goal  Note: Fall Risk Interventions:            Medication Interventions: Bed/chair exit alarm, Patient to call before getting OOB, Teach patient to arise slowly

## 2021-07-29 NOTE — DISCHARGE INSTRUCTIONS
Electrophysiology Study (EPS)/Cardiac Ablation   Discharge Instructions      You have just had a Cardiac Ablation for: atrial flutter. There were catheters temporarily placed in your heart through a puncture in the Veins and/or Arteries in your groin. WHAT TO EXPECT      If you have had a Cardiac Ablation please be aware that you may experience mild chest pain that will resolve within 24-48 hours. If the Chest Pain begins radiating down your shoulders or arms, becomes severe or breathing becomes difficult, call 911 or go to the closest emergency room.  Mild to moderate, non-painful, bruising or mild swelling at the puncture site is not un-common, and will resolve in 7 - 14 days, and may extend down your thigh as it heals.  If a closure device was used to seal your artery or vein, a separate pamphlet will be given to you with these discharge instructions. It is very important that you review the information in the pamphlet for different restrictions and precautions.  You have a small gauze dressing applied to the puncture site in your groin. You may remove this the following morning.  You MAY receive a 30 day heart monitor in the mail to wear after your procedure. This is to evaluate your heart rhythm post ablation. MEDICATIONS      Take only the medications prescribed to you at discharge. ACTIVITY      A responsible adult must take you home. Do not drive a car for 82-50 hours.  Rest quietly for the remainder of the day.  Do not lift anything greater than 10 pounds for 3 days.  Limit bending at the puncture site and use of stairs for at least 3 days.  You may remove the bandage and shower the morning after the procedure. Do not take a bath for 3 days.  You may resume normal activities after 1 week. SYMPTOMS THAT NEED TO BE REPORTED IMMEDIATELY      Bleeding at the puncture site.   If there is bleeding, lie down and hold firm direct pressure for at least 5 minutes. If the bleeding does not stop, go to the closest emergency room, or call 911.  Temperature more than 100.5 F.   Redness or warmth at the puncture site.  Increasing pain, numbness, coolness or blue discoloration of the extremity where the puncture is located.  Pulsating mass at the puncture site.  A new lump at the puncture site, or increasing swelling at the site. Please be aware that a pea or marble sized lump is normal, anything larger or increasing in size should be reported.  Bruising at the puncture site that enlarges or becomes painful (some bruising at the site is common and will go away in 7-14 days).  Rapid heart rate or palpitations.  Dizziness, lightheadedness, fainting.  REMEMBER: If you feel something is an emergency or cannot be handled over the phone, call 911 or go to the closest emergency room.       FOLLOW UP CARE     Anthony Brennan NP in 2 weeks  Dr. Beverley Henry in 3 months        Selina Reynaga MD  Cardiac Electrophysiology / Cardiology    [address]  [address] [place] [address], [facility], [address], [facility], [facility]  [phone] / [phone] Fax   [phone] / [phone] Fax

## 2021-07-29 NOTE — PROGRESS NOTES
1210: TRANSFER - IN REPORT:    Verbal report received from [facility] RN(name) on Abdias Matthew.  being received from EP lab(unit) for routine progression of care      Report consisted of patients Situation, Background, Assessment and   Recommendations(SBAR). Information from the following report(s) Procedure Summary was reviewed with the receiving nurse. Opportunity for questions and clarification was provided. Assessment completed upon patients arrival to unit and care assumed. 1210: Patient received from EP lab. Patient with quikclot to right groin site. Site clean, dry and intact. No hematoma. Pulses palpable. VS stable. Patient with no complaints at this time. HOB flat    1245: Assessed patient gag reflex. Return. Assisted patient with drink. 130: [facility] at the bedside. Office to contact patient about 30 day monitor. 1315: HOB elevated 30 degrees. Patient with quikclot to right groin site. Site clean, dry and intact. No hematoma. Pulses palpable. VS stable. Patient with no complaints at this time. 1345: HOB elevated 45 degrees. Patient with quikclot to right groin site. Site clean, dry and intact. No hematoma. Pulses palpable. VS stable. Patient with no complaints at this time. Assisted with lunch. 1400: Discharge instructions and prescriptions reviewed with patient and his wife. Opportunity provided for questions. Patient and his wife verbalized understanding. Signed copy of discharge placed in the front of patient's chart. 1425: Patient dangled on the side of the bed. Patient with quikclot to right groin site. Site clean, dry and intact. No hematoma. Pulses palpable. VS stable. Patient with no complaints at this time. 1430: Patient ambulated to the bathroom. Voided. Gait steady. Patient with no complaints. 1435: IV and tele removed. Patient escorted via wheelchair to entrance. Patient wife driving. Patient discharged into care of wife.

## 2021-07-29 NOTE — PROCEDURES
Successful cavotricuspid isthmus ablation with 3d mapping, LA pacing/recording using conscious sedation      Plan:  Dc diltiazem  Plan for 30 day monitor  FU 2 weeks

## 2021-07-30 NOTE — DISCHARGE SUMMARY
Cardiac Electrophysiology Discharge Summary       Patient ID:  Kitty Lassiter  [id number]  16 y.o.  1960    Admit Date: 7/27/2021    Discharge Date: 7/29/2021    Admitting Physician: Roel Nelson MD     Discharge Physician: Roel Nelson MD    Admission Diagnoses: Atrial flutter [facility]) [I48.92]    Discharge Diagnoses: Active Problems:    Atrial flutter ([address] (7/27/2021)        Discharge Condition: stable    Cardiology Procedures this Admission:  AFL ablation    Hospital Course: Patient underwent above procedure without complication and remained stable without acute events. Discharge Exam:  Visit Vitals  /77 (BP 1 Location: Left upper arm, BP Patient Position: Other (Comment))   Pulse 85   Temp 97.7 °F (36.5 °C)   Resp 16   Ht 6' 1\" (1.854 m)   Wt 193 lb 5.5 oz (87.7 kg)   SpO2 95%   BMI 25.51 kg/m²       Abdomen: soft, non-tender  Extremities: extremities normal  Heart: regular rate and rhythm  Lungs: clear to auscultation bilaterally  Neck: no JVD  Neurologic: Grossly normal  Pulses: 2+ and symmetric    Disposition: Home    Patient Instructions:   Cannot display discharge medications since this patient is not currently admitted. Referenced discharge instructions provided by nursing for diet and activity.     Follow-up with Roel Nelson MD             Signed:  Nereyda Mckenzie MD  Cardiac Electrophysiology  7/30/2021 11:15 AM     .

## 2021-08-02 ENCOUNTER — TELEPHONE (OUTPATIENT)
Dept: CARDIOLOGY CLINIC | Age: 61
End: 2021-08-02

## 2021-08-02 NOTE — TELEPHONE ENCOUNTER
Patient is requesting a call in regards to his post procedure questions. Patient is requesting a call from either NANO Cervantes or one of the nurses.     PHONE: [phone]

## 2021-08-02 NOTE — TELEPHONE ENCOUNTER
Returned patient call, ID verified using two patient identifiers. Patient calling to clarify which medications he is supposed to be taking after his aflutter ablation. Reviewed patient's medications and advised him that he was to stop his Diltiazem. Patient also had questions about what a \"P\" wave is with his EKG. Reviewed what that means in regards to his arrhythmia. Patient states that he has had two alerts on his watch that said AFIB but that he felt fine and his heart rate has been around 70 bpm.  Advised patient that the next time that happens he can try to obtain a EKG so we can see what is happening but if he is having no symptoms I would not stress about those alerts. His 30 day monitor should be mailed to him this week. Patient verbalized understanding and will call with any other questions.      Future Appointments   Date Time Provider Matt Butler   8/13/2021 10:00 AM NANO Fu   1/3/2022  1:40 PM Meenakshi Ayala MD [facility] AMB

## 2021-08-10 ENCOUNTER — TELEPHONE (OUTPATIENT)
Dept: CARDIOLOGY CLINIC | Age: 61
End: 2021-08-10

## 2021-08-10 NOTE — TELEPHONE ENCOUNTER
----- Message from Kaveh Mena sent at 8/10/2021 10:43 AM EDT -----  Regarding: denial from insurance  The Utilization Review Nurse from [facility] calling in reference to a denial from the patient's insurance company for a ablation that was scheduled and the insurance states it was not medically necessary, surgery was scheduled for 21-21 for an ablation. [facility] [place]  Utilization Review Nurse/Gonzalo Silveira  [phone]     Returned call to [place] with [facility] Utilization review. She states that at Peer to peer needs to be completed by Dr. Melo Reyes with patient's insurance company as his aflutter ablation was denied. Advised her that I will schedule a P2P when Dr. Melo Reyes returns to the office next week and I know his availability. Information to schedule P2P:  Call:  Starla Medina voicemail # [phone] & leave message with the followin. Patient's name and   2. Dr. Melo Reyes contact number and dates/times available.   3. Ref # of denial: [id number]

## 2021-08-13 ENCOUNTER — OFFICE VISIT (OUTPATIENT)
Dept: CARDIOLOGY CLINIC | Age: 61
End: 2021-08-13
Payer: COMMERCIAL

## 2021-08-13 VITALS
WEIGHT: 194.2 LBS | SYSTOLIC BLOOD PRESSURE: 130 MMHG | HEART RATE: 77 BPM | HEIGHT: 73 IN | OXYGEN SATURATION: 98 % | RESPIRATION RATE: 16 BRPM | DIASTOLIC BLOOD PRESSURE: 82 MMHG | BODY MASS INDEX: 25.74 KG/M2

## 2021-08-13 DIAGNOSIS — I48.0 PAROXYSMAL ATRIAL FIBRILLATION WITH RVR (HCC): Primary | ICD-10-CM

## 2021-08-13 PROCEDURE — 99215 OFFICE O/P EST HI 40 MIN: CPT | Performed by: NURSE PRACTITIONER

## 2021-08-13 NOTE — PROGRESS NOTES
Room #: 2      Visit Vitals  /82 (BP 1 Location: Left upper arm, BP Patient Position: Sitting, BP Cuff Size: Adult)   Pulse 77   Resp 16   Ht 6' 1\" (1.854 m)   Wt 194 lb 3.2 oz (88.1 kg)   SpO2 98%   BMI 25.62 kg/m²         Chest pain:  NO  Shortness of breath:  NO  Edema: NO  Palpitations, skipped beats, rapid heartbeat:  NO  Dizziness:  NO    1. Have you been to the ER, urgent care clinic since your last visit? Hospitalized since your last visit? No    2. Have you seen or consulted any other health care providers outside of the [address] [place] [address] since your last visit? Include any pap smears or colon screening.  No      Refills:  NO

## 2021-08-13 NOTE — PROGRESS NOTES
HISTORY OF PRESENTING ILLNESS      Rukhsana Herrmann is a 62 y.o. male who was seen in the ER for atrial flutter with RVR and spontaneously converted to NSR on IV diltiazem. He had echocardiogram which showed a normal LV function and was discharged home with Diltiazem and daily aspirin. He is a CHADsVASC 0. He was planned for AFL ablation; however, had recurence of AFL with RVR and was readmitted to the hospital. He underwent ablation of the CTI and diltiazem was discontinued and he was discharged with 30 day monitor. He is feeling well and had improvement in energy levels over the past week. He is working to stay hydrated and slowly progressing back to previous activity level. PAST MEDICAL HISTORY     Past Medical History:   Diagnosis Date    Cancer [facility])     testicular & prostate           PAST SURGICAL HISTORY     Past Surgical History:   Procedure Laterality Date    HX OTHER SURGICAL      testicular orchectomy    HX OTHER SURGICAL      prostatectomy    HX OTHER SURGICAL Left     removal of lipoma on arm          ALLERGIES     Allergies   Allergen Reactions    Minocycline Other (comments)     Made patient feel \"achy and lethargic\"          FAMILY HISTORY     Family History   Problem Relation Age of Onset    Heart Disease Mother     negative for cardiac disease       SOCIAL HISTORY     Social History     Socioeconomic History    Marital status:      Spouse name: Not on file    Number of children: Not on file    Years of education: Not on file    Highest education level: Not on file   Tobacco Use    Smoking status: Never Smoker    Smokeless tobacco: Never Used   Substance and Sexual Activity    Alcohol use:  Yes     Alcohol/week: 5.8 standard drinks     Types: 7 Cans of beer per week    Drug use: No     Social Determinants of Health     Financial Resource Strain:     Difficulty of Paying Living Expenses:    Food Insecurity:     Worried About Running Out of Food in the Last Year:    [address] in the Last Year:    Transportation Needs:     Lack of Transportation (Medical):  Lack of Transportation (Non-Medical):    Physical Activity:     Days of Exercise per Week:     Minutes of Exercise per Session:    Stress:     Feeling of Stress :    Social Connections:     Frequency of Communication with Friends and Family:     Frequency of Social Gatherings with Friends and Family:     Attends Denominational Services:     Active Member of Clubs or Organizations:     Attends Club or Organization Meetings:     Marital Status:          MEDICATIONS     Current Outpatient Medications   Medication Sig    therapeutic multivitamin (THERAGRAN) tablet Take 1 Tablet by mouth daily.  aspirin delayed-release 81 mg tablet Take 81 mg by mouth daily.  zinc 50 mg tab tablet Take 1 Dose by mouth daily.  fluticasone (FLONASE) 50 mcg/actuation nasal spray 1 [place] by Both Nostrils route daily. No current facility-administered medications for this visit. I have reviewed the nurses notes, vitals, problem list, allergy list, medical history, family, social history and medications. REVIEW OF SYMPTOMS      General: Pt denies excessive weight gain or loss. Pt is able to conduct ADL's  HEENT: Denies blurred vision, headaches, hearing loss, epistaxis and difficulty swallowing. Respiratory: Denies cough, congestion, shortness of breath, LORA, wheezing or stridor. Cardiovascular: Denies precordial pain, palpitations, edema or PND  Gastrointestinal: Denies poor appetite, indigestion, abdominal pain or blood in stool  Genitourinary: Denies hematuria, dysuria, increased urinary frequency  Musculoskeletal: Denies joint pain or swelling from muscles or joints  Neurologic: Denies tremor, paresthesias, headache, or sensory motor disturbance  Psychiatric: Denies confusion, insomnia, depression  Integumentray: Denies rash, itching or ulcers.   Hematologic: Denies easy bruising, bleeding       PHYSICAL EXAMINATION      Vitals: see vitals section  General: Well developed, in no acute distress. HEENT: No jaundice, oral mucosa moist, no oral ulcers  Neck: Supple, no stiffness, no lymphadenopathy, supple  Heart:  Normal S1/S2 negative S3 or S4. Regular, no murmur, gallop or rub, no jugular venous distention  Respiratory: Clear bilaterally x 4, no wheezing or rales  Abdomen:   Soft, non-tender, bowel sounds are active. Extremities:  No edema, normal cap refill, no cyanosis. Musculoskeletal: No clubbing, no deformities  Neuro: A&Ox3, speech clear, gait stable, cooperative, no focal neurologic deficits  Skin: Skin color is normal. No rashes or lesions. Non diaphoretic, moist.  Vascular: 2+ pulses symmetric in all extremities       DIAGNOSTIC DATA      EKG:     Visit Vitals  /82 (BP 1 Location: Left upper arm, BP Patient Position: Sitting, BP Cuff Size: Adult)   Pulse 77   Resp 16   Ht 6' 1\" (1.854 m)   Wt 194 lb 3.2 oz (88.1 kg)   SpO2 98%   BMI 25.62 kg/m²        LABORATORY DATA      Lab Results   Component Value Date/Time    WBC 6.8 07/27/2021 03:11 PM    HGB 15.5 07/27/2021 03:11 PM    HCT 44.0 07/27/2021 03:11 PM    PLATELET 560 69/78/2740 03:11 PM    MCV 93.2 07/27/2021 03:11 PM      Lab Results   Component Value Date/Time    Sodium 139 07/27/2021 03:11 PM    Potassium 4.0 07/27/2021 03:11 PM    Chloride 109 (H) 07/27/2021 03:11 PM    CO2 29 07/27/2021 03:11 PM    Anion gap 1 (L) 07/27/2021 03:11 PM    Glucose 161 (H) 07/27/2021 03:11 PM    BUN 14 07/27/2021 03:11 PM    Creatinine 0.91 07/27/2021 03:11 PM    BUN/Creatinine ratio 15 07/27/2021 03:11 PM    GFR est AA >60 07/27/2021 03:11 PM    GFR est non-AA >60 07/27/2021 03:11 PM    Calcium 8.4 (L) 07/27/2021 03:11 PM    Bilirubin, total 0.6 07/27/2021 03:11 PM    Alk.  phosphatase 76 07/27/2021 03:11 PM    Protein, total 7.1 07/27/2021 03:11 PM    Albumin 3.9 07/27/2021 03:11 PM    Globulin 3.2 07/27/2021 03:11 PM    A-G Ratio 1.2 07/27/2021 03:11 PM    ALT (SGPT) 35 07/27/2021 03:11 PM           ASSESSMENT      1. Atrial flutter    S/p CTI ablation 7/2021  2. Atrial fibrillation  3. PVCs         PLAN     He is feeling well with improvement in symptoms s/p ablation and his 30 day monitor confirms sinus rhythm with one episode of AF on 8/11/2021. He was asymptomatic and rate controlled during that time. We reviewed the diagnosis of atrial fibrillation and spent time discussing different treatment options moving forward. He will continue ASA daily (CHADs VASC 0) and complete monitoring. Should he have RVR with his AF will consider re-introduction of diltiazem. He'd prefer to avoid long term medication use and we can discuss the role of AF ablation if needed. Follow up in 3 months with Dr. Lauren Ramirez     3 months with Dr. Lisandro Welch    Time: 45 minutes    Thank you, Emmanuel Ramirez MD and Dr. Emmanuel Velazquez for allowing me to participate in the care of this extraordinarily pleasant male. Please do not hesitate to contact me for further questions/concerns.      NANO Moreira [facility] [address].  [address] [place] [address]  [phone] / [phone] Fax   [phone] / [phone] Fax

## 2021-08-13 NOTE — LETTER
8/13/2021    Patient: Farrukh Garcia. YOB: 1960   Date of Visit: 8/13/2021     Jessica Staton, [address] [facility] [id number]  Via Fax: [phone]    Dear Jessica Staton MD,      Thank you for referring Mr. Chris Sanchez to [facility] for evaluation. My notes for this consultation are attached. If you have questions, please do not hesitate to call me. I look forward to following your patient along with you.       Sincerely,    Kalina Purdy NP

## 2021-08-15 PROCEDURE — 2709999900 HC NON-CHARGEABLE SUPPLY

## 2021-08-16 ENCOUNTER — TELEPHONE (OUTPATIENT)
Dept: CARDIOLOGY CLINIC | Age: 61
End: 2021-08-16

## 2021-08-16 NOTE — TELEPHONE ENCOUNTER
Patient is requesting a call back in regards to some questions he has after his appointment with Romaine Hawkins. Patient states it is regarding his upcoming procedure.     PHONE: [phone]

## 2021-08-17 NOTE — TELEPHONE ENCOUNTER
Returned patient call, ID verified using two patient identifiers. Patient has questions concerning the episodes of AFib that have been noted on his monitor and his treatment plan. He is requesting a call from Dr. Angel Figueredo to have questions answered and discuss treatment plan. Best contact # is [phone]. Discussed having patient use his My Chart portal. He states he was having difficulty with his password.

## 2021-08-24 ENCOUNTER — TELEPHONE (OUTPATIENT)
Dept: CARDIOLOGY CLINIC | Age: 61
End: 2021-08-24

## 2021-08-30 ENCOUNTER — TELEPHONE (OUTPATIENT)
Dept: CARDIOLOGY CLINIC | Age: 61
End: 2021-08-30

## 2021-08-30 RX ORDER — DILTIAZEM HYDROCHLORIDE 120 MG/1
120 CAPSULE, EXTENDED RELEASE ORAL DAILY
Qty: 30 CAPSULE | Refills: 3 | Status: SHIPPED | OUTPATIENT
Start: 2021-08-30 | End: 2021-11-02

## 2021-08-30 NOTE — TELEPHONE ENCOUNTER
NANO Montemayor RN  Caller: Unspecified (Today,  9:50 AM)  Continue diltiazem 120mg daily and let's see him on Arabella schedule next week. He did have an episode of AF that evening on his monitor, he's had some brief episodes of AF (rate controlled) but this is the first he reports symptoms on. .. meaning he probably feels it when he's tachycardic. Can discuss further at his follow up with [facility]. Returned patient call, ID verified using two patient identifiers. Notified patient of Scottie Sanchez NP recommendations above. Patient agreeable to restart Diltiazem 120mg daily. Prescription sent. Patient scheduled for a follow up appointment with  [facility] next week. Reviewed when patient should go to the emergency room, if his heart rate stays elevated over 1 hour and he has symptoms of SOB, CP, or dizziness. Patient verbalized understanding and will call with any other questions. Requested Prescriptions     Signed Prescriptions Disp Refills    dilTIAZem ER (DILACOR XR) 120 mg capsule 30 Capsule 3     Sig: Take 1 Capsule by mouth daily.      Authorizing Provider: Lizett Hagan     Ordering User: Sergio Nash     Future Appointments   Date Time Provider Matt Butler   9/8/2021  9:20 AM MD JOSÉ Morrison BS AMB   11/17/2021 10:00 AM MD JOSÉ Morrison BS AMB   1/3/2022  1:40 PM Sanjana Ayala MD [facility] BS AMB

## 2021-08-30 NOTE — TELEPHONE ENCOUNTER
Returned patient call, ID verified using two patient identifiers. Patient calling because he had some fast irregular heart beats last night. He was watching TV around 9 pm last night and stated he started to feel funny. He took his heart rate several times and it was 108 to 144 bpm.  He did an EKG on his apple watch and it showed AFIB with his heart rate around 134. He states he called the on call doctor twice and never heard back. He ended up taking a leftover Diltiazem 120mg. He states his heart rate slowed to the 90's and he was able to go to sleep. He denies any chest pain, dizziness, or shortness of breath and states he just felt funny, felt his heart beat in his neck and felt anxious. Of note he stated he received his first covid vaccine on Friday evening and was working out in the yard this weekend. He states he made sure to hydrate. Asked that patient try and send the EKG he obtained through his apple watch. Advised him that I will have Smiley Galicia, NP review his heart monitor for any alert and I will call him back with her recommendations. Patient verbalized understanding and will call with any other questions.

## 2021-08-30 NOTE — TELEPHONE ENCOUNTER
Patient calling to speak to the nurse as he stated he had a heart rate of 142 on 8/29/21, patient had an ablation done on 7/27/21, please advise              [phone]

## 2021-09-08 ENCOUNTER — OFFICE VISIT (OUTPATIENT)
Dept: CARDIOLOGY CLINIC | Age: 61
End: 2021-09-08
Payer: COMMERCIAL

## 2021-09-08 ENCOUNTER — TELEPHONE (OUTPATIENT)
Dept: CARDIOLOGY CLINIC | Age: 61
End: 2021-09-08

## 2021-09-08 VITALS
OXYGEN SATURATION: 97 % | RESPIRATION RATE: 16 BRPM | BODY MASS INDEX: 25.66 KG/M2 | HEIGHT: 73 IN | SYSTOLIC BLOOD PRESSURE: 140 MMHG | DIASTOLIC BLOOD PRESSURE: 84 MMHG | HEART RATE: 68 BPM | WEIGHT: 193.6 LBS

## 2021-09-08 DIAGNOSIS — Z01.812 PRE-PROCEDURE LAB EXAM: ICD-10-CM

## 2021-09-08 DIAGNOSIS — I48.0 PAROXYSMAL ATRIAL FIBRILLATION (HCC): Primary | ICD-10-CM

## 2021-09-08 DIAGNOSIS — I48.0 PAROXYSMAL ATRIAL FIBRILLATION WITH RVR (HCC): Primary | ICD-10-CM

## 2021-09-08 DIAGNOSIS — I48.0 PAROXYSMAL ATRIAL FIBRILLATION WITH RVR (HCC): ICD-10-CM

## 2021-09-08 PROCEDURE — 99215 OFFICE O/P EST HI 40 MIN: CPT | Performed by: INTERNAL MEDICINE

## 2021-09-08 NOTE — PROGRESS NOTES
Room EP 4  Visit Vitals  BP (!) 140/84 (BP 1 Location: Left upper arm, BP Patient Position: Sitting)   Pulse 68   Resp 16   Ht 6' 1\" (1.854 m)   Wt 193 lb 9.6 oz (87.8 kg)   SpO2 97%   BMI 25.54 kg/m²       Has an Apple Watch-checks HR daily  Chest pain: no  Shortness of breath: no  Edema: no  Palpitations, Skipped beats, Rapid heartbeat: no  Dizziness: no  Fatigue:no    New diagnosis/Surgeries: no    ER/Hospitalizations: no    Refills:no

## 2021-09-08 NOTE — PROGRESS NOTES
HISTORY OF PRESENTING ILLNESS      Justina Kevin is a 62 y.o. male who was seen in the ER for atrial flutter with RVR and spontaneously converted to NSR on IV diltiazem. He had echocardiogram which showed a normal LV function and was discharged home with Diltiazem and daily aspirin. He is a CHADsVASC 0. He was planned for AFL ablation; however, had recurence of AFL with RVR and was readmitted to the hospital. He underwent ablation of the CTI and diltiazem was discontinued and he was discharged with 30 day monitor. He is feeling well and had improvement in energy levels over the past week. He is working to stay hydrated and slowly progressing back to previous activity level. Monitoring demonstrated recurrent episodes of atrial fibrillation with rates up to the 130s that were asymptomatic. He was noted to have rates in the 40s at other times. PAST MEDICAL HISTORY     Past Medical History:   Diagnosis Date    Cancer [facility])     testicular & prostate           PAST SURGICAL HISTORY     Past Surgical History:   Procedure Laterality Date    HX OTHER SURGICAL      testicular orchectomy    HX OTHER SURGICAL      prostatectomy    HX OTHER SURGICAL Left     removal of lipoma on arm          ALLERGIES     Allergies   Allergen Reactions    Minocycline Other (comments)     Made patient feel \"achy and lethargic\"          FAMILY HISTORY     Family History   Problem Relation Age of Onset    Heart Disease Mother     negative for cardiac disease       SOCIAL HISTORY     Social History     Socioeconomic History    Marital status:      Spouse name: Not on file    Number of children: Not on file    Years of education: Not on file    Highest education level: Not on file   Tobacco Use    Smoking status: Never Smoker    Smokeless tobacco: Never Used   Substance and Sexual Activity    Alcohol use:  Yes     Alcohol/week: 2.0 standard drinks     Types: 2 Cans of beer per week    Drug use: No     Social Determinants of Health     Financial Resource Strain:     Difficulty of Paying Living Expenses:    Food Insecurity:     Worried About Running Out of Food in the Last Year:     [address] in the Last Year:    Transportation Needs:     Lack of Transportation (Medical):  Lack of Transportation (Non-Medical):    Physical Activity:     Days of Exercise per Week:     Minutes of Exercise per Session:    Stress:     Feeling of Stress :    Social Connections:     Frequency of Communication with Friends and Family:     Frequency of Social Gatherings with Friends and Family:     Attends Gnosticism Services:     Active Member of Clubs or Organizations:     Attends Club or Organization Meetings:     Marital Status:          MEDICATIONS     Current Outpatient Medications   Medication Sig    dilTIAZem ER (DILACOR XR) 120 mg capsule Take 1 Capsule by mouth daily.  therapeutic multivitamin (THERAGRAN) tablet Take 1 Tablet by mouth daily.  aspirin delayed-release 81 mg tablet Take 81 mg by mouth daily.  zinc 50 mg tab tablet Take 1 Dose by mouth daily.  fluticasone (FLONASE) 50 mcg/actuation nasal spray 1 [place] by Both Nostrils route daily. No current facility-administered medications for this visit. I have reviewed the nurses notes, vitals, problem list, allergy list, medical history, family, social history and medications. REVIEW OF SYMPTOMS      General: Pt denies excessive weight gain or loss. Pt is able to conduct ADL's  HEENT: Denies blurred vision, headaches, hearing loss, epistaxis and difficulty swallowing. Respiratory: Denies cough, congestion, shortness of breath, LORA, wheezing or stridor.   Cardiovascular: Denies precordial pain, palpitations, edema or PND  Gastrointestinal: Denies poor appetite, indigestion, abdominal pain or blood in stool  Genitourinary: Denies hematuria, dysuria, increased urinary frequency  Musculoskeletal: Denies joint pain or swelling from muscles or joints  Neurologic: Denies tremor, paresthesias, headache, or sensory motor disturbance  Psychiatric: Denies confusion, insomnia, depression  Integumentray: Denies rash, itching or ulcers. Hematologic: Denies easy bruising, bleeding       PHYSICAL EXAMINATION      Vitals: see vitals section  General: Well developed, in no acute distress. HEENT: No jaundice, oral mucosa moist, no oral ulcers  Neck: Supple, no stiffness, no lymphadenopathy, supple  Heart:  Normal S1/S2 negative S3 or S4. Regular, no murmur, gallop or rub, no jugular venous distention  Respiratory: Clear bilaterally x 4, no wheezing or rales  Abdomen:   Soft, non-tender, bowel sounds are active. Extremities:  No edema, normal cap refill, no cyanosis. Musculoskeletal: No clubbing, no deformities  Neuro: A&Ox3, speech clear, gait stable, cooperative, no focal neurologic deficits  Skin: Skin color is normal. No rashes or lesions.  Non diaphoretic, moist.  Vascular: 2+ pulses symmetric in all extremities       DIAGNOSTIC DATA      EKG:     Visit Vitals  BP (!) 140/84 (BP 1 Location: Left upper arm, BP Patient Position: Sitting)   Pulse 68   Resp 16   Ht 6' 1\" (1.854 m)   Wt 193 lb 9.6 oz (87.8 kg)   SpO2 97%   BMI 25.54 kg/m²        LABORATORY DATA      Lab Results   Component Value Date/Time    WBC 6.8 07/27/2021 03:11 PM    HGB 15.5 07/27/2021 03:11 PM    HCT 44.0 07/27/2021 03:11 PM    PLATELET 118 65/86/7676 03:11 PM    MCV 93.2 07/27/2021 03:11 PM      Lab Results   Component Value Date/Time    Sodium 139 07/27/2021 03:11 PM    Potassium 4.0 07/27/2021 03:11 PM    Chloride 109 (H) 07/27/2021 03:11 PM    CO2 29 07/27/2021 03:11 PM    Anion gap 1 (L) 07/27/2021 03:11 PM    Glucose 161 (H) 07/27/2021 03:11 PM    BUN 14 07/27/2021 03:11 PM    Creatinine 0.91 07/27/2021 03:11 PM    BUN/Creatinine ratio 15 07/27/2021 03:11 PM    GFR est AA >60 07/27/2021 03:11 PM    GFR est non-AA >60 07/27/2021 03:11 PM    Calcium 8.4 (L) 07/27/2021 03:11 PM    Bilirubin, total 0.6 07/27/2021 03:11 PM    Alk. phosphatase 76 07/27/2021 03:11 PM    Protein, total 7.1 07/27/2021 03:11 PM    Albumin 3.9 07/27/2021 03:11 PM    Globulin 3.2 07/27/2021 03:11 PM    A-G Ratio 1.2 07/27/2021 03:11 PM    ALT (SGPT) 35 07/27/2021 03:11 PM           ASSESSMENT      1. Atrial flutter    S/p CTI ablation 7/2021  2. Atrial fibrillation  3. PVCs         PLAN     Discussed AF ablation versus drug therapy. He will discuss with his wife and if he wishes to proceed with AF ablation we will plan for cardiac MRI, AF ablation at [facility] and will plan to evaluate CTI for block at that time as well. FOLLOW-UP       Thank you, Vandana Cota MD and Dr. Summer Barton for allowing me to participate in the care of this extraordinarily pleasant male. Please do not hesitate to contact me for further questions/concerns.      Karina Galvez MD    [facility] [address].  [address] [facility], [address]  [phone] / [phone] Fax   [phone] / [phone] Fax

## 2021-09-08 NOTE — TELEPHONE ENCOUNTER
Patient is requesting a call back in regards to scheduling the procedure discussed at his appointment today.     PHONE: [phone]

## 2021-09-13 NOTE — TELEPHONE ENCOUNTER
Patient is calling again in regards to making an appointment for the procedure with Dr. Juanita Avilez.     PHONE: [phone]

## 2021-09-14 ENCOUNTER — DOCUMENTATION ONLY (OUTPATIENT)
Dept: CARDIOLOGY CLINIC | Age: 61
End: 2021-09-14

## 2021-09-14 NOTE — TELEPHONE ENCOUNTER
Returned patient call, ID verified using two patient identifiers. Patient scheduled for a AFIB ablation with Dr. Ethan Castañeda at [facility] on Tuesday, 10/19/21 at 8:00 am (asked to arrive at 6:00 am. )  Reviewed pre-procedure instructions with patient and time allowed for questions. Instructions to be mailed today to the address confirmed on file. Covid swab ordered for Friday, 10/15/21 at [facility] between 8-11am.      Patient verbalized understanding and will call with any other questions.

## 2021-09-16 ENCOUNTER — TELEPHONE (OUTPATIENT)
Dept: CARDIOLOGY CLINIC | Age: 61
End: 2021-09-16

## 2021-09-16 NOTE — TELEPHONE ENCOUNTER
Called patient, ID verified using two patient identifiers. Notified patient that there is a conflict with his originally scheduled AFIB ablation date of 10/19/21. Patient rescheduled for his ablation on Tuesday 11/2/21 at 8:00 am (asked to arrive at 6:00 am.)    Reviewed the timing of lab work and covid swab. Will get his covid swab done on Friday 10/29/21. Patient verbalized understanding and will call with any other questions.

## 2021-09-25 NOTE — TELEPHONE ENCOUNTER
Patient called stating he has questions regarding his follow up appointment          [phone]
Returned patient call, ID verified using two patient identifiers. Patient calling with questions regarding his appointment with Reina Bustamante, and follow appointment with Dr. Bandar Duenas. Patient asking if he has had any other episode of AFIB since 8/11/21. Reviewed monitor with patient and notified him that he had two episodes that lasted less than a minute on 8/13/21 and 8/15/21. He would like to know if he needs to wear the monitor for more than 30 days to see everything that is happening before his appointment in November. Advised him that insurance will not cover monitors over 30 days. He wants to know if someone will notify him of his results at the end of his 30 day period. Advised that he will be called once the results are reviewed only if there are medication changes that need to be made or if Dr. Cindy [place] NP feel like he needs to be seen sooner. Patient verbalized understanding and will call with any other questions.
26-Sep-2021 01:06

## 2021-10-11 ENCOUNTER — HOSPITAL ENCOUNTER (OUTPATIENT)
Dept: MRI IMAGING | Age: 61
Discharge: HOME | End: 2021-10-11
Attending: INTERNAL MEDICINE
Payer: COMMERCIAL

## 2021-10-11 VITALS — BODY MASS INDEX: 24.94 KG/M2 | WEIGHT: 189 LBS

## 2021-10-11 DIAGNOSIS — I48.0 PAROXYSMAL ATRIAL FIBRILLATION (HCC): ICD-10-CM

## 2021-10-11 PROCEDURE — 77030021566

## 2021-10-11 PROCEDURE — 71555 MRI ANGIO CHEST W OR W/O DYE: CPT | Performed by: INTERNAL MEDICINE

## 2021-10-11 PROCEDURE — 74011250636 HC RX REV CODE- 250/636: Performed by: INTERNAL MEDICINE

## 2021-10-11 PROCEDURE — 75561 CARDIAC MRI FOR MORPH W/DYE: CPT

## 2021-10-11 PROCEDURE — A9576 INJ PROHANCE MULTIPACK: HCPCS | Performed by: INTERNAL MEDICINE

## 2021-10-11 RX ADMIN — GADOTERIDOL 25 ML: 279.3 INJECTION, SOLUTION INTRAVENOUS at 14:33

## 2021-10-19 ENCOUNTER — PREP FOR PROCEDURE (OUTPATIENT)
Dept: CARDIOLOGY CLINIC | Age: 61
End: 2021-10-19

## 2021-10-19 RX ORDER — SODIUM CHLORIDE 0.9 % (FLUSH) 0.9 %
5-40 SYRINGE (ML) INJECTION AS NEEDED
Status: CANCELLED | OUTPATIENT
Start: 2021-10-19

## 2021-10-19 RX ORDER — SODIUM CHLORIDE 0.9 % (FLUSH) 0.9 %
5-40 SYRINGE (ML) INJECTION EVERY 8 HOURS
Status: CANCELLED | OUTPATIENT
Start: 2021-10-19

## 2021-10-26 ENCOUNTER — TELEPHONE (OUTPATIENT)
Dept: CARDIOLOGY CLINIC | Age: 61
End: 2021-10-26

## 2021-10-26 NOTE — TELEPHONE ENCOUNTER
Returned patient call, ID verified using two patient identifiers. Answered all of patient's questions concerning his Afib ablation scheduled for 11/2/21. Patient may take all of his medications with a sip of water on the day of his procedure. Patient verbalizes understanding of all information.

## 2021-10-26 NOTE — TELEPHONE ENCOUNTER
Patient said he has questions about what medication he is able to take the day of his procedure. Please call back with instructions.       Phone [phone]

## 2021-10-27 LAB
BUN SERPL-MCNC: 15 MG/DL (ref 8–27)
BUN/CREAT SERPL: 15 (ref 10–24)
CALCIUM SERPL-MCNC: 9.3 MG/DL (ref 8.6–10.2)
CHLORIDE SERPL-SCNC: 104 MMOL/L (ref 96–106)
CO2 SERPL-SCNC: 26 MMOL/L (ref 20–29)
CREAT SERPL-MCNC: 1 MG/DL (ref 0.76–1.27)
ERYTHROCYTE [DISTWIDTH] IN BLOOD BY AUTOMATED COUNT: 12.3 % (ref 11.6–15.4)
GLUCOSE SERPL-MCNC: 94 MG/DL (ref 65–99)
HCT VFR BLD AUTO: 43.1 % (ref 37.5–51)
HGB BLD-MCNC: 15 G/DL (ref 13–17.7)
INR PPP: 1 (ref 0.9–1.2)
MCH RBC QN AUTO: 32.3 PG (ref 26.6–33)
MCHC RBC AUTO-ENTMCNC: 34.8 G/DL (ref 31.5–35.7)
MCV RBC AUTO: 93 FL (ref 79–97)
PLATELET # BLD AUTO: 173 X10E3/UL (ref 150–450)
POTASSIUM SERPL-SCNC: 4.7 MMOL/L (ref 3.5–5.2)
PROTHROMBIN TIME: 10.3 SEC (ref 9.1–12)
RBC # BLD AUTO: 4.65 X10E6/UL (ref 4.14–5.8)
SODIUM SERPL-SCNC: 141 MMOL/L (ref 134–144)
WBC # BLD AUTO: 4.5 X10E3/UL (ref 3.4–10.8)

## 2021-10-29 ENCOUNTER — HOSPITAL ENCOUNTER (OUTPATIENT)
Dept: LAB | Age: 61
Discharge: HOME | End: 2021-10-29
Payer: COMMERCIAL

## 2021-10-29 ENCOUNTER — TRANSCRIBE ORDER (OUTPATIENT)
Dept: REGISTRATION | Age: 61
End: 2021-10-29

## 2021-10-29 DIAGNOSIS — Z01.812 PRE-OPERATIVE LABORATORY EXAMINATION: ICD-10-CM

## 2021-10-29 DIAGNOSIS — Z01.812 PRE-OPERATIVE LABORATORY EXAMINATION: Primary | ICD-10-CM

## 2021-10-29 PROCEDURE — U0005 INFEC AGEN DETEC AMPLI PROBE: HCPCS

## 2021-10-30 LAB
SARS-COV-2, XPLCVT: NOT DETECTED
SOURCE, COVRS: NORMAL

## 2021-11-02 ENCOUNTER — APPOINTMENT (OUTPATIENT)
Dept: CARDIAC CATH/INVASIVE PROCEDURES | Age: 61
End: 2021-11-02
Attending: INTERNAL MEDICINE
Payer: COMMERCIAL

## 2021-11-02 ENCOUNTER — HOSPITAL ENCOUNTER (OUTPATIENT)
Age: 61
Setting detail: OUTPATIENT SURGERY
Discharge: HOME | End: 2021-11-02
Attending: INTERNAL MEDICINE | Admitting: INTERNAL MEDICINE
Payer: COMMERCIAL

## 2021-11-02 ENCOUNTER — ANESTHESIA EVENT (OUTPATIENT)
Dept: CARDIAC CATH/INVASIVE PROCEDURES | Age: 61
End: 2021-11-02
Payer: COMMERCIAL

## 2021-11-02 ENCOUNTER — ANESTHESIA (OUTPATIENT)
Dept: CARDIAC CATH/INVASIVE PROCEDURES | Age: 61
End: 2021-11-02
Payer: COMMERCIAL

## 2021-11-02 VITALS
WEIGHT: 191 LBS | BODY MASS INDEX: 25.31 KG/M2 | HEIGHT: 73 IN | HEART RATE: 96 BPM | RESPIRATION RATE: 20 BRPM | DIASTOLIC BLOOD PRESSURE: 86 MMHG | SYSTOLIC BLOOD PRESSURE: 132 MMHG | TEMPERATURE: 97.8 F | OXYGEN SATURATION: 96 %

## 2021-11-02 DIAGNOSIS — I48.91 ATRIAL FIBRILLATION, UNSPECIFIED TYPE (HCC): ICD-10-CM

## 2021-11-02 LAB
ABO + RH BLD: NORMAL
ACT BLD: 197 SECS (ref 79–138)
ACT BLD: 219 SECS (ref 79–138)
ACT BLD: 230 SECS (ref 79–138)
ANTIGENS PRESENT BLD: NORMAL
BLOOD GROUP ANTIBODIES SERPL: NORMAL
BLOOD GROUP ANTIBODIES SERPL: NORMAL
SPECIMEN EXP DATE BLD: NORMAL

## 2021-11-02 PROCEDURE — 86905 BLOOD TYPING RBC ANTIGENS: CPT

## 2021-11-02 PROCEDURE — 77030013797 HC KT TRNSDUC PRSSR EDWD -A: Performed by: INTERNAL MEDICINE

## 2021-11-02 PROCEDURE — 77030039046 HC PAD DEFIB RADIOTRNSPNT CNMD -B: Performed by: INTERNAL MEDICINE

## 2021-11-02 PROCEDURE — 74011000250 HC RX REV CODE- 250: Performed by: REGISTERED NURSE

## 2021-11-02 PROCEDURE — 76060000035 HC ANESTHESIA 2 TO 2.5 HR: Performed by: INTERNAL MEDICINE

## 2021-11-02 PROCEDURE — 36415 COLL VENOUS BLD VENIPUNCTURE: CPT

## 2021-11-02 PROCEDURE — 93662 INTRACARDIAC ECG (ICE): CPT | Performed by: INTERNAL MEDICINE

## 2021-11-02 PROCEDURE — 93312 ECHO TRANSESOPHAGEAL: CPT

## 2021-11-02 PROCEDURE — 86901 BLOOD TYPING SEROLOGIC RH(D): CPT

## 2021-11-02 PROCEDURE — C1732 CATH, EP, DIAG/ABL, 3D/VECT: HCPCS | Performed by: INTERNAL MEDICINE

## 2021-11-02 PROCEDURE — 77030035291 HC TBNG PMP SMARTABLATE J&J -B: Performed by: INTERNAL MEDICINE

## 2021-11-02 PROCEDURE — 93613 INTRACARDIAC EPHYS 3D MAPG: CPT | Performed by: INTERNAL MEDICINE

## 2021-11-02 PROCEDURE — 74011250637 HC RX REV CODE- 250/637: Performed by: INTERNAL MEDICINE

## 2021-11-02 PROCEDURE — 77030029359 HC PRB ESOPH TEMP CATH ANTM -F: Performed by: INTERNAL MEDICINE

## 2021-11-02 PROCEDURE — 85347 COAGULATION TIME ACTIVATED: CPT

## 2021-11-02 PROCEDURE — 77030029065 HC DRSG HEMO QCLOT ZMED -B: Performed by: INTERNAL MEDICINE

## 2021-11-02 PROCEDURE — 86870 RBC ANTIBODY IDENTIFICATION: CPT

## 2021-11-02 PROCEDURE — 77030003390 HC NDL ANGI MRTM -A: Performed by: INTERNAL MEDICINE

## 2021-11-02 PROCEDURE — 74011250636 HC RX REV CODE- 250/636: Performed by: REGISTERED NURSE

## 2021-11-02 PROCEDURE — 93656 COMPRE EP EVAL ABLTJ ATR FIB: CPT | Performed by: INTERNAL MEDICINE

## 2021-11-02 PROCEDURE — 77030041242 HC CBL CONN CARTO 3 J&J -D: Performed by: INTERNAL MEDICINE

## 2021-11-02 PROCEDURE — 77030027107 HC PTCH EXT REF CARTO3 J&J -F: Performed by: INTERNAL MEDICINE

## 2021-11-02 PROCEDURE — 77030026438 HC STYL ET INTUB CARD -A: Performed by: REGISTERED NURSE

## 2021-11-02 PROCEDURE — 77030008684 HC TU ET CUF COVD -B: Performed by: REGISTERED NURSE

## 2021-11-02 PROCEDURE — C1894 INTRO/SHEATH, NON-LASER: HCPCS | Performed by: INTERNAL MEDICINE

## 2021-11-02 PROCEDURE — 2709999900 HC NON-CHARGEABLE SUPPLY: Performed by: INTERNAL MEDICINE

## 2021-11-02 PROCEDURE — C1893 INTRO/SHEATH, FIXED,NON-PEEL: HCPCS | Performed by: INTERNAL MEDICINE

## 2021-11-02 PROCEDURE — C1760 CLOSURE DEV, VASC: HCPCS | Performed by: INTERNAL MEDICINE

## 2021-11-02 PROCEDURE — C1759 CATH, INTRA ECHOCARDIOGRAPHY: HCPCS | Performed by: INTERNAL MEDICINE

## 2021-11-02 RX ORDER — SODIUM CHLORIDE 0.9 % (FLUSH) 0.9 %
5-40 SYRINGE (ML) INJECTION AS NEEDED
Status: DISCONTINUED | OUTPATIENT
Start: 2021-11-02 | End: 2021-11-02 | Stop reason: HOSPADM

## 2021-11-02 RX ORDER — PROTAMINE SULFATE 10 MG/ML
INJECTION, SOLUTION INTRAVENOUS AS NEEDED
Status: DISCONTINUED | OUTPATIENT
Start: 2021-11-02 | End: 2021-11-02 | Stop reason: HOSPADM

## 2021-11-02 RX ORDER — DEXAMETHASONE SODIUM PHOSPHATE 4 MG/ML
INJECTION, SOLUTION INTRA-ARTICULAR; INTRALESIONAL; INTRAMUSCULAR; INTRAVENOUS; SOFT TISSUE AS NEEDED
Status: DISCONTINUED | OUTPATIENT
Start: 2021-11-02 | End: 2021-11-02 | Stop reason: HOSPADM

## 2021-11-02 RX ORDER — EPHEDRINE SULFATE/0.9% NACL/PF 50 MG/5 ML
SYRINGE (ML) INTRAVENOUS AS NEEDED
Status: DISCONTINUED | OUTPATIENT
Start: 2021-11-02 | End: 2021-11-02 | Stop reason: HOSPADM

## 2021-11-02 RX ORDER — ONDANSETRON 2 MG/ML
INJECTION INTRAMUSCULAR; INTRAVENOUS AS NEEDED
Status: DISCONTINUED | OUTPATIENT
Start: 2021-11-02 | End: 2021-11-02 | Stop reason: HOSPADM

## 2021-11-02 RX ORDER — FENTANYL CITRATE 50 UG/ML
INJECTION, SOLUTION INTRAMUSCULAR; INTRAVENOUS AS NEEDED
Status: DISCONTINUED | OUTPATIENT
Start: 2021-11-02 | End: 2021-11-02 | Stop reason: HOSPADM

## 2021-11-02 RX ORDER — HYDROCODONE BITARTRATE AND ACETAMINOPHEN 5; 325 MG/1; MG/1
1 TABLET ORAL
Status: DISCONTINUED | OUTPATIENT
Start: 2021-11-02 | End: 2021-11-02 | Stop reason: HOSPADM

## 2021-11-02 RX ORDER — GLYCOPYRROLATE 0.2 MG/ML
INJECTION INTRAMUSCULAR; INTRAVENOUS AS NEEDED
Status: DISCONTINUED | OUTPATIENT
Start: 2021-11-02 | End: 2021-11-02 | Stop reason: HOSPADM

## 2021-11-02 RX ORDER — NEOSTIGMINE METHYLSULFATE 1 MG/ML
INJECTION, SOLUTION INTRAVENOUS AS NEEDED
Status: DISCONTINUED | OUTPATIENT
Start: 2021-11-02 | End: 2021-11-02 | Stop reason: HOSPADM

## 2021-11-02 RX ORDER — ONDANSETRON 2 MG/ML
4 INJECTION INTRAMUSCULAR; INTRAVENOUS
Status: DISCONTINUED | OUTPATIENT
Start: 2021-11-02 | End: 2021-11-02 | Stop reason: HOSPADM

## 2021-11-02 RX ORDER — SODIUM CHLORIDE 0.9 % (FLUSH) 0.9 %
5-40 SYRINGE (ML) INJECTION EVERY 8 HOURS
Status: DISCONTINUED | OUTPATIENT
Start: 2021-11-02 | End: 2021-11-02 | Stop reason: HOSPADM

## 2021-11-02 RX ORDER — PANTOPRAZOLE SODIUM 40 MG/1
40 TABLET, DELAYED RELEASE ORAL DAILY
Qty: 30 TABLET | Refills: 0 | Status: SHIPPED | OUTPATIENT
Start: 2021-11-02 | End: 2021-12-02

## 2021-11-02 RX ORDER — HEPARIN SODIUM 1000 [USP'U]/ML
INJECTION, SOLUTION INTRAVENOUS; SUBCUTANEOUS AS NEEDED
Status: DISCONTINUED | OUTPATIENT
Start: 2021-11-02 | End: 2021-11-02 | Stop reason: HOSPADM

## 2021-11-02 RX ORDER — ROCURONIUM BROMIDE 10 MG/ML
INJECTION, SOLUTION INTRAVENOUS AS NEEDED
Status: DISCONTINUED | OUTPATIENT
Start: 2021-11-02 | End: 2021-11-02 | Stop reason: HOSPADM

## 2021-11-02 RX ORDER — PROPOFOL 10 MG/ML
INJECTION, EMULSION INTRAVENOUS AS NEEDED
Status: DISCONTINUED | OUTPATIENT
Start: 2021-11-02 | End: 2021-11-02 | Stop reason: HOSPADM

## 2021-11-02 RX ORDER — SUCCINYLCHOLINE CHLORIDE 20 MG/ML
INJECTION INTRAMUSCULAR; INTRAVENOUS AS NEEDED
Status: DISCONTINUED | OUTPATIENT
Start: 2021-11-02 | End: 2021-11-02 | Stop reason: HOSPADM

## 2021-11-02 RX ORDER — SODIUM CHLORIDE 9 MG/ML
INJECTION, SOLUTION INTRAVENOUS
Status: DISCONTINUED | OUTPATIENT
Start: 2021-11-02 | End: 2021-11-02 | Stop reason: HOSPADM

## 2021-11-02 RX ORDER — ACETAMINOPHEN 325 MG/1
650 TABLET ORAL
Status: DISCONTINUED | OUTPATIENT
Start: 2021-11-02 | End: 2021-11-02 | Stop reason: HOSPADM

## 2021-11-02 RX ORDER — PHENYLEPHRINE HCL IN 0.9% NACL 0.4MG/10ML
SYRINGE (ML) INTRAVENOUS AS NEEDED
Status: DISCONTINUED | OUTPATIENT
Start: 2021-11-02 | End: 2021-11-02 | Stop reason: HOSPADM

## 2021-11-02 RX ADMIN — GLYCOPYRROLATE 0.4 MG: 0.2 INJECTION INTRAMUSCULAR; INTRAVENOUS at 10:36

## 2021-11-02 RX ADMIN — PROPOFOL 200 MG: 10 INJECTION, EMULSION INTRAVENOUS at 08:54

## 2021-11-02 RX ADMIN — DEXAMETHASONE SODIUM PHOSPHATE 8 MG: 4 INJECTION, SOLUTION INTRAMUSCULAR; INTRAVENOUS at 09:16

## 2021-11-02 RX ADMIN — PROTAMINE SULFATE 10 MG: 10 INJECTION, SOLUTION INTRAVENOUS at 10:35

## 2021-11-02 RX ADMIN — PROTAMINE SULFATE 10 MG: 10 INJECTION, SOLUTION INTRAVENOUS at 10:32

## 2021-11-02 RX ADMIN — FENTANYL CITRATE 50 MCG: 50 INJECTION, SOLUTION INTRAMUSCULAR; INTRAVENOUS at 08:54

## 2021-11-02 RX ADMIN — Medication 5 MG: at 10:42

## 2021-11-02 RX ADMIN — PROTAMINE SULFATE 5 MG: 10 INJECTION, SOLUTION INTRAVENOUS at 10:42

## 2021-11-02 RX ADMIN — PROTAMINE SULFATE 5 MG: 10 INJECTION, SOLUTION INTRAVENOUS at 10:29

## 2021-11-02 RX ADMIN — Medication 120 MCG: at 10:39

## 2021-11-02 RX ADMIN — PHENYLEPHRINE HYDROCHLORIDE 20 MCG/MIN: 10 INJECTION INTRAVENOUS at 08:59

## 2021-11-02 RX ADMIN — PROTAMINE SULFATE 5 MG: 10 INJECTION, SOLUTION INTRAVENOUS at 10:44

## 2021-11-02 RX ADMIN — PROTAMINE SULFATE 5 MG: 10 INJECTION, SOLUTION INTRAVENOUS at 10:43

## 2021-11-02 RX ADMIN — NEOSTIGMINE METHYLSULFATE 1.5 MG: 1 INJECTION, SOLUTION INTRAVENOUS at 10:36

## 2021-11-02 RX ADMIN — SODIUM CHLORIDE: 900 INJECTION, SOLUTION INTRAVENOUS at 08:48

## 2021-11-02 RX ADMIN — ROCURONIUM BROMIDE 5 MG: 10 SOLUTION INTRAVENOUS at 08:54

## 2021-11-02 RX ADMIN — Medication 5 MG: at 10:43

## 2021-11-02 RX ADMIN — ROCURONIUM BROMIDE 20 MG: 10 SOLUTION INTRAVENOUS at 09:48

## 2021-11-02 RX ADMIN — SODIUM CHLORIDE: 900 INJECTION, SOLUTION INTRAVENOUS at 09:24

## 2021-11-02 RX ADMIN — HEPARIN SODIUM 9000 UNITS: 1000 INJECTION, SOLUTION INTRAVENOUS; SUBCUTANEOUS at 09:30

## 2021-11-02 RX ADMIN — HEPARIN SODIUM 1000 UNITS: 1000 INJECTION, SOLUTION INTRAVENOUS; SUBCUTANEOUS at 10:27

## 2021-11-02 RX ADMIN — HEPARIN SODIUM 3000 UNITS: 1000 INJECTION, SOLUTION INTRAVENOUS; SUBCUTANEOUS at 09:46

## 2021-11-02 RX ADMIN — ACETAMINOPHEN 650 MG: 325 TABLET ORAL at 12:00

## 2021-11-02 RX ADMIN — Medication 80 MCG: at 10:37

## 2021-11-02 RX ADMIN — APIXABAN 5 MG: 5 TABLET, FILM COATED ORAL at 12:05

## 2021-11-02 RX ADMIN — HEPARIN SODIUM 3000 UNITS: 1000 INJECTION, SOLUTION INTRAVENOUS; SUBCUTANEOUS at 10:06

## 2021-11-02 RX ADMIN — SUCCINYLCHOLINE CHLORIDE 160 MG: 20 INJECTION, SOLUTION INTRAMUSCULAR; INTRAVENOUS at 08:55

## 2021-11-02 RX ADMIN — ONDANSETRON HYDROCHLORIDE 4 MG: 2 INJECTION, SOLUTION INTRAMUSCULAR; INTRAVENOUS at 10:31

## 2021-11-02 NOTE — H&P
HISTORY OF PRESENTING ILLNESS      Mare Cline is a 62 y.o. male who was seen in the ER for atrial flutter with RVR and spontaneously converted to NSR on IV diltiazem. He had echocardiogram which showed a normal LV function and was discharged home with Diltiazem and daily aspirin. He is a CHADsVASC 0. He was planned for AFL ablation; however, had recurence of AFL with RVR and was readmitted to the hospital. He underwent ablation of the CTI and diltiazem was discontinued and he was discharged with 30 day monitor. He is feeling well and had improvement in energy levels over the past week. He is working to stay hydrated and slowly progressing back to previous activity level. Monitoring demonstrated recurrent episodes of atrial fibrillation with rates up to the 130s that were asymptomatic. He was noted to have rates in the 40s at other times. PAST MEDICAL HISTORY     Past Medical History:   Diagnosis Date    Cancer [facility])     testicular & prostate           PAST SURGICAL HISTORY     Past Surgical History:   Procedure Laterality Date    HX OTHER SURGICAL      testicular orchectomy    HX OTHER SURGICAL      prostatectomy    HX OTHER SURGICAL Left     removal of lipoma on arm          ALLERGIES     Allergies   Allergen Reactions    Minocycline Other (comments)     Made patient feel \"achy and lethargic\"          FAMILY HISTORY     Family History   Problem Relation Age of Onset    Heart Disease Mother     negative for cardiac disease       SOCIAL HISTORY     Social History     Socioeconomic History    Marital status:      Spouse name: Not on file    Number of children: Not on file    Years of education: Not on file    Highest education level: Not on file   Tobacco Use    Smoking status: Never Smoker    Smokeless tobacco: Never Used   Substance and Sexual Activity    Alcohol use:  Yes     Alcohol/week: 2.0 standard drinks     Types: 2 Cans of beer per week    Drug use: No     Social Determinants of Health     Financial Resource Strain:     Difficulty of Paying Living Expenses:    Food Insecurity:     Worried About Running Out of Food in the Last Year:     [address] in the Last Year:    Transportation Needs:     Lack of Transportation (Medical):  Lack of Transportation (Non-Medical):    Physical Activity:     Days of Exercise per Week:     Minutes of Exercise per Session:    Stress:     Feeling of Stress :    Social Connections:     Frequency of Communication with Friends and Family:     Frequency of Social Gatherings with Friends and Family:     Attends [employer]oration[employer] Services:     Active Member of Clubs or Organizations:     Attends Club or Organization Meetings:     Marital Status:          MEDICATIONS     Current Facility-Administered Medications   Medication Dose Route Frequency    sodium chloride (NS) flush 5-40 mL  5-40 mL IntraVENous Q8H    sodium chloride (NS) flush 5-40 mL  5-40 mL IntraVENous PRN       I have reviewed the nurses notes, vitals, problem list, allergy list, medical history, family, social history and medications. REVIEW OF SYMPTOMS      General: Pt denies excessive weight gain or loss. Pt is able to conduct ADL's  HEENT: Denies blurred vision, headaches, hearing loss, epistaxis and difficulty swallowing. Respiratory: Denies cough, congestion, shortness of breath, LORA, wheezing or stridor. Cardiovascular: Denies precordial pain, palpitations, edema or PND  Gastrointestinal: Denies poor appetite, indigestion, abdominal pain or blood in stool  Genitourinary: Denies hematuria, dysuria, increased urinary frequency  Musculoskeletal: Denies joint pain or swelling from muscles or joints  Neurologic: Denies tremor, paresthesias, headache, or sensory motor disturbance  Psychiatric: Denies confusion, insomnia, depression  Integumentray: Denies rash, itching or ulcers.   Hematologic: Denies easy bruising, bleeding       PHYSICAL EXAMINATION Vitals: see vitals section  General: Well developed, in no acute distress. HEENT: No jaundice, oral mucosa moist, no oral ulcers  Neck: Supple, no stiffness, no lymphadenopathy, supple  Heart:  Normal S1/S2 negative S3 or S4. Regular, no murmur, gallop or rub, no jugular venous distention  Respiratory: Clear bilaterally x 4, no wheezing or rales  Abdomen:   Soft, non-tender, bowel sounds are active. Extremities:  No edema, normal cap refill, no cyanosis. Musculoskeletal: No clubbing, no deformities  Neuro: A&Ox3, speech clear, gait stable, cooperative, no focal neurologic deficits  Skin: Skin color is normal. No rashes or lesions. Non diaphoretic, moist.  Vascular: 2+ pulses symmetric in all extremities       DIAGNOSTIC DATA      EKG:     Visit Vitals  BP (!) 145/85 (BP 1 Location: Left arm, BP Patient Position: At rest)   Pulse 67   Temp 97.8 °F (36.6 °C)   Resp 20   Ht 6' 1\" (1.854 m)   Wt 191 lb (86.6 kg)   SpO2 100%   BMI 25.20 kg/m²        LABORATORY DATA      Lab Results   Component Value Date/Time    WBC 4.5 10/26/2021 09:30 AM    HGB 15.0 10/26/2021 09:30 AM    HCT 43.1 10/26/2021 09:30 AM    PLATELET 348 11/43/7910 09:30 AM    MCV 93 10/26/2021 09:30 AM      Lab Results   Component Value Date/Time    Sodium 141 10/26/2021 09:30 AM    Potassium 4.7 10/26/2021 09:30 AM    Chloride 104 10/26/2021 09:30 AM    CO2 26 10/26/2021 09:30 AM    Anion gap 1 (L) 07/27/2021 03:11 PM    Glucose 94 10/26/2021 09:30 AM    BUN 15 10/26/2021 09:30 AM    Creatinine 1.00 10/26/2021 09:30 AM    BUN/Creatinine ratio 15 10/26/2021 09:30 AM    GFR est AA 94 10/26/2021 09:30 AM    GFR est non-AA 81 10/26/2021 09:30 AM    Calcium 9.3 10/26/2021 09:30 AM    Bilirubin, total 0.6 07/27/2021 03:11 PM    Alk.  phosphatase 76 07/27/2021 03:11 PM    Protein, total 7.1 07/27/2021 03:11 PM    Albumin 3.9 07/27/2021 03:11 PM    Globulin 3.2 07/27/2021 03:11 PM    A-G Ratio 1.2 07/27/2021 03:11 PM    ALT (SGPT) 35 07/27/2021 03:11 PM ASSESSMENT      1. Atrial flutter    S/p CTI ablation 7/2021  2. Atrial fibrillation  3.  PVCs         PLAN      AF ablation         [address] [facility] [address], [address] [facility]  [address]    Pete Flores  [phone] / [phone] Fax   [phone] / [phone] Fax

## 2021-11-02 NOTE — PROGRESS NOTES
Cardiac Cath Lab Procedure Area Arrival Note:    Yudy NoemiUnique arrived to Cardiac Cath Lab, Procedure Area. Patient identifiers verified with NAME and DATE OF BIRTH. Procedure verified with patient. Consent forms verified. Allergies verified. Patient informed of procedure and plan of care. Questions answered with review. Patient voiced understanding of procedure and plan of care. Patient on cardiac monitor, non-invasive blood pressure, SPO2 monitor. On ra. Anesthesia here for sedation and airway management. .  IV of ns onatient status doing well without problems. Patient is A&Ox 4. Patient reports no pain. Patient medicated during procedure with orders obtained and verified by Dr. Husam Gregory. Refer to patients Cardiac Cath Lab PROCEDURE REPORT for vital signs, assessment, status, and response during procedure, printed at end of case. Printed report on chart or scanned into chart.

## 2021-11-02 NOTE — ROUTINE PROCESS
Cardiac Cath Lab Recovery Arrival Note: 
 
 
Daphne Fish arrived to Cardiac Cath Lab, Recovery Area. Staff introduced to patient. Patient identifiers verified with NAME and DATE OF BIRTH. Procedure verified with patient. Consent forms reviewed and signed by patient or authorized representative and verified. Allergies verified. Patient informed of procedure and plan of care. Questions answered with review. Patient prepped for procedure, per orders from physician, prior to arrival. 
 
Patient on cardiac monitor, non-invasive blood pressure, SPO2 monitor. Patient is A&Ox 4. Patient reports no complaints. Patient in stretcher, in low position, with side rails up, call bell within reach, patient instructed to call of assistance as needed. Patient prep in: [facility] Recovery Area, Bed# fast track 4. Family in: waiting room upstairs. Prep by: ABDELRAHMAN Alvarado

## 2021-11-02 NOTE — PROCEDURES
Successful AF ablation performed using LA pacing/recording, 3d mapping, ICE  CTI block confirmed      Plan:  DC diltiazem/aspirin  Eliquis 5 mg bid x 90 day  Protonix x 1 month  Plan for 30 day monitor post-healing phase

## 2021-11-02 NOTE — PROGRESS NOTES
TRANSFER - OUT REPORT:    Verbal report given to [facility] on [place]. being transferred to  for routine progression of care       Report consisted of patients Situation, Background, Assessment and   Recommendations(SBAR). Information from the following report(s) SBAR, Procedure Summary and MAR was reviewed with the receiving nurse. Opportunity for questions and clarification was provided.

## 2021-11-02 NOTE — PROGRESS NOTES
TRANSFER - IN REPORT:    Verbal report received from tom Marrero, and LAURA on [employer].  being received from procedure for routine progression of care. Report consisted of patients Situation, Background, Assessment and Recommendations(SBAR). Information from the following report(s) Procedure Summary, Intake/Output, MAR, Accordion, Recent Results and Med Rec Status was reviewed with the receiving clinician. Opportunity for questions and clarification was provided. Assessment completed upon patients arrival to [address] [place] [address]. Cardiac Cath Lab Recovery Arrival Note:    [employer]. arrived to [address] [facility]. Patient procedure= EPS/ ablation at [facility]. Patient on cardiac monitor, non-invasive blood pressure, SPO2 monitor. On  O2 @ 2 lpm via n/c. IV  of nacl on pump at 25 ml/hr. Patient status doing well without problems. Patient is A&Ox 4. Patient reports no complaints. PROCEDURE SITE CHECK:    Procedure site:without any bleeding and or hematoma, no pain/discomfort reported at procedure site. No change in patient status. Continue to monitor patient and status. Dr Liana Mejia talked with pt and called his wife and left a message.   1200 tylenol and eliquis given

## 2021-11-02 NOTE — PROGRESS NOTES
Discharge instructions reviewed with pt.  1400  Dangled on side of bed, pt \"feels dizzy\", voided , back to bed to rest, pt hasn't slept since back in recover, encouraged to sleep. Given snack pt stated that it might help  Tolerated peanut butter crackers and ginger ale,    1500 Dr Emily Denver made aware pt feeling \"dizzy\" when sitting up  \"give 500 cc bolus of Nacl now  500 ml of nacl given. Updated wife on pt's condition. Dr Emily Denver talked with wife on the phone. [phone]  Working on discount  Card for [employer] for pt. Card given to pt.  1600 dangled on side of bed, pt states \" feel better. Ambulated 100 ft, gait steady, voided, back to stretcher right groin site dsg D&I. Assisted with dressing. Reviewed d/c instructions with pt, teach back method used.

## 2021-11-02 NOTE — ANESTHESIA POSTPROCEDURE EVALUATION
Post-Anesthesia Evaluation and Assessment    Patient: Julio C Peoples. MRN: [id number]  SSN: [id number]    YOB: 1960  Age: 62 y.o. Sex: male      I have evaluated the patient and they are stable and ready for discharge from the PACU. Cardiovascular Function/Vital Signs  Visit Vitals  /71   Pulse 67   Temp 36.6 °C (97.9 °F)   Resp 15   Ht 6' 1\" (1.854 m)   Wt 86.6 kg (191 lb)   SpO2 99%   BMI 25.20 kg/m²       Patient is status post General anesthesia for Procedure(s):  ABLATION A-FIB  W COMPLETE EP STUDY  Ep 3d Mapping  Intracardiac Echocardiogram.    Nausea/Vomiting: None    Postoperative hydration reviewed and adequate. Pain:  Pain Scale 1: Numeric (0 - 10) (11/02/21 0730)  Pain Intensity 1: 0 (11/02/21 0730)   Managed    Neurological Status: At baseline    Mental Status, Level of Consciousness: Alert and  oriented to person, place, and time    Pulmonary Status:   O2 Device: Nasal cannula (11/02/21 1059)   Adequate oxygenation and airway patent    Complications related to anesthesia: None    Post-anesthesia assessment completed. No concerns    Signed By: Jono Noonan MD     November 2, 2021              Procedure(s):  ABLATION A-FIB  W COMPLETE EP STUDY  Ep 3d Mapping  Intracardiac Echocardiogram.    general    <BSHSIANPOST>    INITIAL Post-op Vital signs:   Vitals Value Taken Time   /72 11/02/21 1115   Temp 36.6 °C (97.9 °F) 11/02/21 1059   Pulse 64 11/02/21 1116   Resp 13 11/02/21 1116   SpO2 100 % 11/02/21 1116   Vitals shown include unvalidated device data.

## 2021-11-02 NOTE — DISCHARGE INSTRUCTIONS
Electrophysiology Study (EPS)/Cardiac Ablation   Discharge Instructions      You have just had a Cardiac Ablation for: atrial fibrillation. There were catheters temporarily placed in your heart through a puncture in the Veins and/or Arteries in your groin. WHAT TO EXPECT      If you have had a Cardiac Ablation please be aware that you may experience mild chest pain that will resolve within 24-48 hours. If the Chest Pain begins radiating down your shoulders or arms, becomes severe or breathing becomes difficult, call 911 or go to the closest emergency room.  Mild to moderate, non-painful, bruising or mild swelling at the puncture site is not un-common, and will resolve in 7 - 14 days, and may extend down your thigh as it heals.  If a closure device was used to seal your artery or vein, a separate pamphlet will be given to you with these discharge instructions. It is very important that you review the information in the pamphlet for different restrictions and precautions.  You have a small gauze dressing applied to the puncture site in your groin. You may remove this the following morning.  You MAY receive a 30 day heart monitor in the mail to wear after your procedure. This is to evaluate your heart rhythm post ablation. MEDICATIONS      Take only the medications prescribed to you at discharge. ACTIVITY      A responsible adult must take you home. Do not drive a car for 93-13 hours.  Rest quietly for the remainder of the day.  Do not lift anything greater than 10 pounds for 3 days.  Limit bending at the puncture site and use of stairs for at least 3 days.  You may remove the bandage and shower the morning after the procedure. Do not take a bath for 3 days.  You may resume normal activities after 1 week. SYMPTOMS THAT NEED TO BE REPORTED IMMEDIATELY      Bleeding at the puncture site.   If there is bleeding, lie down and hold firm direct pressure for at least 5 minutes. If the bleeding does not stop, go to the closest emergency room, or call 911.  Temperature more than 100.5 F.   Redness or warmth at the puncture site.  Increasing pain, numbness, coolness or blue discoloration of the extremity where the puncture is located.  Pulsating mass at the puncture site.  A new lump at the puncture site, or increasing swelling at the site. Please be aware that a pea or marble sized lump is normal, anything larger or increasing in size should be reported.  Bruising at the puncture site that enlarges or becomes painful (some bruising at the site is common and will go away in 7-14 days).  Rapid heart rate or palpitations.  Dizziness, lightheadedness, fainting.  REMEMBER: If you feel something is an emergency or cannot be handled over the phone, call 911 or go to the closest emergency room.       FOLLOW UP CARE     Marvel Dancer, NP in 2 weeks  Dr. Sofía Hunter in 3 months        Lennon Klinefelter, MD  Cardiac Electrophysiology / Cardiology    [place] [id number].  [address] [place] [address], [facility], [place] [address]  [address]  [phone] / [phone] Fax   [phone] / [phone] Fax

## 2021-11-02 NOTE — ANESTHESIA PREPROCEDURE EVALUATION
Relevant Problems   CARDIOVASCULAR   (+) Atrial flutter (HCC)   (+) Atrial flutter with rapid ventricular response (HCC)   (+) HTN (hypertension), benign       Anesthetic History   No history of anesthetic complications            Review of Systems / Medical History  Patient summary reviewed, nursing notes reviewed and pertinent labs reviewed    Pulmonary  Within defined limits                 Neuro/Psych   Within defined limits           Cardiovascular    Hypertension: well controlled        Dysrhythmias : atrial fibrillation      Exercise tolerance: >4 METS  Comments:  Estimated LVEF is 60 - 65%. Normal cavity size, wall thickness, systolic function (ejection fraction normal) and diastolic function.  Wall motion: normal.   GI/Hepatic/Renal                Endo/Other             Other Findings            Physical Exam    Airway  Mallampati: I  TM Distance: > 6 cm  Neck ROM: normal range of motion   Mouth opening: Normal     Cardiovascular    Rhythm: regular           Dental  No notable dental hx       Pulmonary  Breath sounds clear to auscultation               Abdominal         Other Findings            Anesthetic Plan    ASA: 3  Anesthesia type: general          Induction: Intravenous  Anesthetic plan and risks discussed with: Patient

## 2021-11-04 ENCOUNTER — PATIENT MESSAGE (OUTPATIENT)
Dept: CARDIOLOGY CLINIC | Age: 61
End: 2021-11-04

## 2021-11-17 ENCOUNTER — OFFICE VISIT (OUTPATIENT)
Dept: CARDIOLOGY CLINIC | Age: 61
End: 2021-11-17
Payer: COMMERCIAL

## 2021-11-17 VITALS
DIASTOLIC BLOOD PRESSURE: 82 MMHG | BODY MASS INDEX: 25.31 KG/M2 | OXYGEN SATURATION: 98 % | HEART RATE: 76 BPM | RESPIRATION RATE: 14 BRPM | HEIGHT: 73 IN | SYSTOLIC BLOOD PRESSURE: 130 MMHG | WEIGHT: 191 LBS

## 2021-11-17 DIAGNOSIS — I48.91 ATRIAL FIBRILLATION, UNSPECIFIED TYPE (HCC): Primary | ICD-10-CM

## 2021-11-17 PROCEDURE — 99215 OFFICE O/P EST HI 40 MIN: CPT | Performed by: INTERNAL MEDICINE

## 2021-11-17 NOTE — PROGRESS NOTES
HISTORY OF PRESENTING ILLNESS      iNcolasa Mclaughlin is a 62 y.o. male who was seen in the ER for atrial flutter with RVR and spontaneously converted to NSR on IV diltiazem. He had echocardiogram which showed a normal LV function and was discharged home with Diltiazem and daily aspirin. He is a CHADsVASC 0. He was planned for AFL ablation; however, had recurence of AFL with RVR and was readmitted to the hospital. He underwent ablation of the CTI and diltiazem was discontinued and he was discharged with 30 day monitor. He is feeling well and had improvement in energy levels over the past week. He is working to stay hydrated and slowly progressing back to previous activity level. Monitoring demonstrated recurrent episodes of atrial fibrillation with rates up to the 130s that were asymptomatic. He was noted to have rates in the 40s at other times. He underwent successful AF ablation on 11/2/2021 after which his diltiazem was discontinued and he started Eliquis 5mg BID. 30 day monitor ordered has shown SR throughout.         PAST MEDICAL HISTORY     Past Medical History:   Diagnosis Date    Cancer [facility])     testicular & prostate           PAST SURGICAL HISTORY     Past Surgical History:   Procedure Laterality Date    HX OTHER SURGICAL      testicular orchectomy    HX OTHER SURGICAL      prostatectomy    HX OTHER SURGICAL Left     removal of lipoma on arm          ALLERGIES     Allergies   Allergen Reactions    Minocycline Other (comments)     Made patient feel \"achy and lethargic\"          FAMILY HISTORY     Family History   Problem Relation Age of Onset    Heart Disease Mother     negative for cardiac disease       SOCIAL HISTORY     Social History     Socioeconomic History    Marital status:    Tobacco Use    Smoking status: Never Smoker    Smokeless tobacco: Never Used   Substance and Sexual Activity    Alcohol use: Not Currently     Alcohol/week: 2.0 standard drinks Types: 2 Cans of beer per week    Drug use: No         MEDICATIONS     Current Outpatient Medications   Medication Sig    apixaban (Eliquis) 5 mg tablet Take 1 Tablet by mouth two (2) times a day for 90 days.  pantoprazole (PROTONIX) 40 mg tablet Take 1 Tablet by mouth daily for 30 days.  therapeutic multivitamin (THERAGRAN) tablet Take 1 Tablet by mouth daily.  zinc 50 mg tab tablet Take 1 Dose by mouth daily.  fluticasone (FLONASE) 50 mcg/actuation nasal spray 1 Barrett by Both Nostrils route daily. No current facility-administered medications for this visit. I have reviewed the nurses notes, vitals, problem list, allergy list, medical history, family, social history and medications. REVIEW OF SYMPTOMS      General: Pt denies excessive weight gain or loss. Pt is able to conduct ADL's  HEENT: Denies blurred vision, headaches, hearing loss, epistaxis and difficulty swallowing. Respiratory: Denies cough, congestion, shortness of breath, LORA, wheezing or stridor. Cardiovascular: Denies precordial pain, palpitations, edema or PND  Gastrointestinal: Denies poor appetite, indigestion, abdominal pain or blood in stool  Genitourinary: Denies hematuria, dysuria, increased urinary frequency  Musculoskeletal: Denies joint pain or swelling from muscles or joints  Neurologic: Denies tremor, paresthesias, headache, or sensory motor disturbance  Psychiatric: Denies confusion, insomnia, depression  Integumentray: Denies rash, itching or ulcers. Hematologic: Denies easy bruising, bleeding       PHYSICAL EXAMINATION      Vitals: see vitals section  General: Well developed, in no acute distress. HEENT: No jaundice, oral mucosa moist, no oral ulcers  Neck: Supple, no stiffness, no lymphadenopathy, supple  Heart:  Normal S1/S2 negative S3 or S4.  Regular, no murmur, gallop or rub, no jugular venous distention  Respiratory: Clear bilaterally x 4, no wheezing or rales  Abdomen:   Soft, non-tender, bowel sounds are active. Extremities:  No edema, normal cap refill, no cyanosis. Musculoskeletal: No clubbing, no deformities  Neuro: A&Ox3, speech clear, gait stable, cooperative, no focal neurologic deficits  Skin: Skin color is normal. No rashes or lesions. Non diaphoretic, moist.  Vascular: 2+ pulses symmetric in all extremities       DIAGNOSTIC DATA      EKG:     Visit Vitals  /82 (BP 1 Location: Left upper arm, BP Patient Position: Sitting)   Pulse 76   Resp 14   Ht 6' 1\" (1.854 m)   Wt 191 lb (86.6 kg)   SpO2 98%   BMI 25.20 kg/m²        LABORATORY DATA      Lab Results   Component Value Date/Time    WBC 4.5 10/26/2021 09:30 AM    HGB 15.0 10/26/2021 09:30 AM    HCT 43.1 10/26/2021 09:30 AM    PLATELET 907 62/67/5385 09:30 AM    MCV 93 10/26/2021 09:30 AM      Lab Results   Component Value Date/Time    Sodium 141 10/26/2021 09:30 AM    Potassium 4.7 10/26/2021 09:30 AM    Chloride 104 10/26/2021 09:30 AM    CO2 26 10/26/2021 09:30 AM    Anion gap 1 (L) 07/27/2021 03:11 PM    Glucose 94 10/26/2021 09:30 AM    BUN 15 10/26/2021 09:30 AM    Creatinine 1.00 10/26/2021 09:30 AM    BUN/Creatinine ratio 15 10/26/2021 09:30 AM    GFR est AA 94 10/26/2021 09:30 AM    GFR est non-AA 81 10/26/2021 09:30 AM    Calcium 9.3 10/26/2021 09:30 AM    Bilirubin, total 0.6 07/27/2021 03:11 PM    Alk. phosphatase 76 07/27/2021 03:11 PM    Protein, total 7.1 07/27/2021 03:11 PM    Albumin 3.9 07/27/2021 03:11 PM    Globulin 3.2 07/27/2021 03:11 PM    A-G Ratio 1.2 07/27/2021 03:11 PM    ALT (SGPT) 35 07/27/2021 03:11 PM           ASSESSMENT      1. Atrial flutter    S/p CTI ablation 7/2021  2. Atrial fibrillation   AF ablation   3.  PVCs       PLAN     Continue monitoring for clinical recurrence of AF; MARKEL Grey in 2/2021        [address]  [address] [facility], [address] [facility]  [phone] / [phone] Fax   [phone] / [phone] Fax

## 2021-11-17 NOTE — PROGRESS NOTES
Room EP 5    Visit Vitals  /82 (BP 1 Location: Left upper arm, BP Patient Position: Sitting)   Pulse 76   Resp 14   Ht 6' 1\" (1.854 m)   Wt 191 lb (86.6 kg)   SpO2 98%   BMI 25.20 kg/m²     Event mon: 11/16-12/16/21  Chest pain: no  Shortness of breath: no  Edema: no  Palpitations, Skipped beats, Rapid heartbeat: no  Dizziness: no  Fatigue:no    New diagnosis/Surgeries: no    ER/Hospitalizations: no    Refills:no

## 2021-11-22 ENCOUNTER — TELEPHONE (OUTPATIENT)
Dept: CARDIOLOGY CLINIC | Age: 61
End: 2021-11-22

## 2021-11-22 NOTE — TELEPHONE ENCOUNTER
Returned patient call, ID verified using two patient identifiers. Patient called because he wanted to know if it was ok for him to receive a flu vaccine. Advised patient that there is no cardiac contraindication with him receiving the flu vaccine so he is ok to proceed. Patient verbalized understanding and will call with any other questions.

## 2021-12-20 ENCOUNTER — TELEPHONE (OUTPATIENT)
Dept: CARDIOLOGY CLINIC | Age: 61
End: 2021-12-20

## 2021-12-20 NOTE — TELEPHONE ENCOUNTER
Dr. Giselle Ledesma performed an ablation on 11/2. Patient stated he returned his monitor a few weeks ago and is requesting the results.      Phone: [phone](QNV leave a vmail)

## 2021-12-20 NOTE — TELEPHONE ENCOUNTER
Sanjay Ross NP  You 5 minutes ago (11:54 AM)     NSR throughout, one PVC      Returned patient call, ID verified using two patient identifiers. Notified patient of his monitor results above. Patient verbalized understanding and will call with any other questions.       Future Appointments   Date Time Provider Matt Butler   1/3/2022  1:40 PM MD JOSÉ Taylor AMB   2/7/2022 10:00 AM NANO Gamez RUBEN AMB

## 2022-01-31 ENCOUNTER — TELEPHONE (OUTPATIENT)
Dept: CARDIOLOGY CLINIC | Age: 62
End: 2022-01-31

## 2022-01-31 NOTE — TELEPHONE ENCOUNTER
Patient was put on eliquis after ablation on 11/2/21, patient would like to verify that he was to only be on the eliquis for a 3 month period after the procedure, please advise        [phone]

## 2022-01-31 NOTE — TELEPHONE ENCOUNTER
Returned patient call, ID verified using two patient identifiers. Patient calling to see if he needs to continue taking his eliquis. He has one day left and was told he would take it for 90 days. Patient denies any symptoms of afib or aflutter. He states he has been feeling very good and feels like he is back to his health prior to his ablation. Advised patient that per Sonny Moy NP he can finish his 90 days of the eliquis and then he is good to stop it. Patient is asking if he should be taking a 81mg aspirin. Advised him that he will be seeing the NP next week and can discuss then. Patient verbalized understanding and will call with any other questions.       Future Appointments   Date Time Provider Matt Butler   2/7/2022 10:00 AM NANO Agarwal AMB   3/1/2022  9:00 AM Radha Ayala MD [facility] AMB

## 2022-02-28 ENCOUNTER — OFFICE VISIT (OUTPATIENT)
Dept: CARDIOLOGY CLINIC | Age: 62
End: 2022-02-28
Payer: COMMERCIAL

## 2022-02-28 VITALS
SYSTOLIC BLOOD PRESSURE: 156 MMHG | WEIGHT: 201.6 LBS | BODY MASS INDEX: 26.72 KG/M2 | HEART RATE: 74 BPM | RESPIRATION RATE: 16 BRPM | DIASTOLIC BLOOD PRESSURE: 90 MMHG | OXYGEN SATURATION: 100 % | HEIGHT: 73 IN

## 2022-02-28 DIAGNOSIS — Z98.890 STATUS POST ABLATION OF ATRIAL FLUTTER: ICD-10-CM

## 2022-02-28 DIAGNOSIS — Z86.79 STATUS POST ABLATION OF ATRIAL FLUTTER: ICD-10-CM

## 2022-02-28 DIAGNOSIS — I48.91 ATRIAL FIBRILLATION, UNSPECIFIED TYPE (HCC): Primary | ICD-10-CM

## 2022-02-28 PROCEDURE — 99214 OFFICE O/P EST MOD 30 MIN: CPT | Performed by: NURSE PRACTITIONER

## 2022-02-28 RX ORDER — ASCORBIC ACID 500 MG
500 TABLET ORAL DAILY
COMMUNITY

## 2022-02-28 NOTE — PROGRESS NOTES
Room #: 4    Feeling great! Has a follow up with Dr. Ligia Goldberg tomorrow and is wondering if it is still needed. Wanted to know if he should start taking 81mg Aspirin again. Visit Vitals  BP (!) 156/90 (BP 1 Location: Right upper arm, BP Patient Position: Sitting, BP Cuff Size: Adult)   Pulse 74   Resp 16   Ht 6' 1\" (1.854 m)   Wt 201 lb 9.6 oz (91.4 kg)   SpO2 100%   BMI 26.60 kg/m²         Chest pain:  NO  Shortness of breath:  NO  Edema: NO  Palpitations, skipped beats, rapid heartbeat:  NO  Dizziness:  NO    1. Have you been to the ER, urgent care clinic since your last visit? Hospitalized since your last visit? No    2. Have you seen or consulted any other health care providers outside of the [address] [place] [address] since your last visit? Include any pap smears or colon screening.  No      Refills:  NO

## 2022-02-28 NOTE — LETTER
2/28/2022    Patient: Yessenia Guevara. YOB: 1960   Date of Visit: 2/28/2022     Hallie Valerio, [address] [place] [phone]  Via Fax: [phone]    Dear Hallie Valerio MD,      Thank you for referring Mr. Susan Cox to [facility] for evaluation. My notes for this consultation are attached. If you have questions, please do not hesitate to call me. I look forward to following your patient along with you.       Sincerely,    Lana Garcia NP

## 2022-02-28 NOTE — PROGRESS NOTES
HISTORY OF PRESENTING ILLNESS      Caty Wood is a 62 y.o. male who is s/p AFL/CTI ablation and AF ablation and is here for follow up of his arrhythmia. His 30 day monitor ordered showed SR throughout. He has stopped Eliquis and is now on baby ASA 81mg. He denies cardiac complaints. PAST MEDICAL HISTORY     Past Medical History:   Diagnosis Date    Cancer [facility])     testicular & prostate           PAST SURGICAL HISTORY     Past Surgical History:   Procedure Laterality Date    HX OTHER SURGICAL      testicular orchectomy    HX OTHER SURGICAL      prostatectomy    HX OTHER SURGICAL Left     removal of lipoma on arm          ALLERGIES     Allergies   Allergen Reactions    Minocycline Other (comments)     Made patient feel \"achy and lethargic\"          FAMILY HISTORY     Family History   Problem Relation Age of Onset    Heart Disease Mother     negative for cardiac disease       SOCIAL HISTORY     Social History     Socioeconomic History    Marital status:    Tobacco Use    Smoking status: Never Smoker    Smokeless tobacco: Never Used   Substance and Sexual Activity    Alcohol use: Not Currently     Alcohol/week: 2.0 standard drinks     Types: 2 Cans of beer per week    Drug use: No         MEDICATIONS     Current Outpatient Medications   Medication Sig    ascorbic acid, vitamin C, (Vitamin C) 500 mg tablet Take 500 mg by mouth daily.  therapeutic multivitamin (THERAGRAN) tablet Take 1 Tablet by mouth daily.  zinc 50 mg tab tablet Take 1 Dose by mouth daily.  fluticasone (FLONASE) 50 mcg/actuation nasal spray 1 [place] by Both Nostrils route daily. No current facility-administered medications for this visit. I have reviewed the nurses notes, vitals, problem list, allergy list, medical history, family, social history and medications. REVIEW OF SYMPTOMS      General: Pt denies excessive weight gain or loss.  Pt is able to conduct ADL's  HEENT: Denies blurred vision, headaches, hearing loss, epistaxis and difficulty swallowing. Respiratory: Denies cough, congestion, shortness of breath, LORA, wheezing or stridor. Cardiovascular: Denies precordial pain, palpitations, edema or PND  Gastrointestinal: Denies poor appetite, indigestion, abdominal pain or blood in stool  Genitourinary: Denies hematuria, dysuria, increased urinary frequency  Musculoskeletal: Denies joint pain or swelling from muscles or joints  Neurologic: Denies tremor, paresthesias, headache, or sensory motor disturbance  Psychiatric: Denies confusion, insomnia, depression  Integumentray: Denies rash, itching or ulcers. Hematologic: Denies easy bruising, bleeding       PHYSICAL EXAMINATION      Vitals: see vitals section  General: Well developed, in no acute distress. HEENT: No jaundice, oral mucosa moist, no oral ulcers  Neck: Supple, no stiffness, no lymphadenopathy, supple  Heart:  Normal S1/S2 negative S3 or S4. Regular, no murmur, gallop or rub, no jugular venous distention  Respiratory: Clear bilaterally x 4, no wheezing or rales  Abdomen:   Soft, non-tender, bowel sounds are active. Extremities:  No edema, normal cap refill, no cyanosis. Musculoskeletal: No clubbing, no deformities  Neuro: A&Ox3, speech clear, gait stable, cooperative, no focal neurologic deficits  Skin: Skin color is normal. No rashes or lesions.  Non diaphoretic, moist.  Vascular: 2+ pulses symmetric in all extremities       DIAGNOSTIC DATA      EKG:     Visit Vitals  BP (!) 156/90 (BP 1 Location: Right upper arm, BP Patient Position: Sitting, BP Cuff Size: Adult)   Pulse 74   Resp 16   Ht 6' 1\" (1.854 m)   Wt 201 lb 9.6 oz (91.4 kg)   SpO2 100%   BMI 26.60 kg/m²        LABORATORY DATA      Lab Results   Component Value Date/Time    WBC 4.5 10/26/2021 09:30 AM    HGB 15.0 10/26/2021 09:30 AM    HCT 43.1 10/26/2021 09:30 AM    PLATELET 021 61/91/1248 09:30 AM    MCV 93 10/26/2021 09:30 AM      Lab Results   Component Value Date/Time    Sodium 141 10/26/2021 09:30 AM    Potassium 4.7 10/26/2021 09:30 AM    Chloride 104 10/26/2021 09:30 AM    CO2 26 10/26/2021 09:30 AM    Anion gap 1 (L) 07/27/2021 03:11 PM    Glucose 94 10/26/2021 09:30 AM    BUN 15 10/26/2021 09:30 AM    Creatinine 1.00 10/26/2021 09:30 AM    BUN/Creatinine ratio 15 10/26/2021 09:30 AM    GFR est AA 94 10/26/2021 09:30 AM    GFR est non-AA 81 10/26/2021 09:30 AM    Calcium 9.3 10/26/2021 09:30 AM    Bilirubin, total 0.6 07/27/2021 03:11 PM    Alk. phosphatase 76 07/27/2021 03:11 PM    Protein, total 7.1 07/27/2021 03:11 PM    Albumin 3.9 07/27/2021 03:11 PM    Globulin 3.2 07/27/2021 03:11 PM    A-G Ratio 1.2 07/27/2021 03:11 PM    ALT (SGPT) 35 07/27/2021 03:11 PM           ASSESSMENT      1. Atrial flutter    S/p CTI ablation 7/2021  2. Atrial fibrillation   AF ablation   3. PVCs       PLAN     He denies recurrence of AF and completed his 90 day course of Eliquis. He is now on ASA 81mg daily and tolerating without issues. Continue current medical therapy and monitoring via Yipit madiha and clinically for recurrence of AF. Encouraged exercise and increased hydration.     [address]  [address] [facility], [address]  [phone] / [phone] Fax   [phone] / [phone] Fax

## 2022-03-18 PROBLEM — I10 HTN (HYPERTENSION), BENIGN: Status: ACTIVE | Noted: 2021-06-17

## 2022-03-19 PROBLEM — I48.92 ATRIAL FLUTTER WITH RAPID VENTRICULAR RESPONSE (HCC): Status: ACTIVE | Noted: 2021-06-17

## 2022-03-20 PROBLEM — I48.92 ATRIAL FLUTTER (HCC): Status: ACTIVE | Noted: 2021-07-27

## 2023-02-07 NOTE — Clinical Note
Please abstract the following data from this visit with this patient into the appropriate field in Epic:   Other Tests found in the patient's chart through Chart Review/Care Everywhere:  Chlamydia testing done on this date: 1999   Transseptal Cath Performed under hemodynamic and Fluoro, Via pullback

## 2023-03-20 NOTE — PROGRESS NOTES
[address]  Endoscopy Preprocedure Instructions      1. On the day of your surgery, please report to registration located on the 2nd floor of the  [facility]. yes    2. You must have a responsible adult to drive you to the hospital, stay at the hospital during your procedure and drive you home. If they leave your procedure will not be started (no exceptions). yes    3. Do not have anything to eat or drink (including water, gum, mints, coffee, and juice) after midnight. This does not apply to the medications you were instructed to take by your physician. yes  If you are currently taking Plavix, Coumadin, Aspirin, or other blood-thinning agents, contact your physician for special instructions. not applicable,    4. If you are having a procedure that requires bowel prep: We recommend that you have only clear liquids the day before your procedure and begin your bowel prep by 5:00 pm.  You may continue to drink clear liquids until midnight. If for any reason you are not able to complete your prep please notify your physician immediately. yes    5. Have a list of all current medications, including vitamins, herbal supplements and any other over the counter medications. Reviewed over the phone    6. If you wear glasses, contacts, dentures and/or hearing aids, they may be removed prior to procedure, please bring a case to store them in. yes    7. You should understand that if you do not follow these instructions your procedure may be cancelled. If your physical condition changes (I.e. fever, cold or flu) please contact your doctor as soon as possible. 8. It is important that you be on time. If for any reason you are unable to keep your appointment please call [phone] the day of or your physicians office prior to your scheduled procedure    9.  Have you received your COVID Vaccine? yes If no, you will need to receive a COVID test/swab here at [facility] the MOB parking lot Monday - Friday 8a - 11am. There are no Saturday or Sunday swabbing at any [facility]. (patient verbalizes understanding) not applicable

## 2023-03-28 ENCOUNTER — HOSPITAL ENCOUNTER (OUTPATIENT)
Age: 63
Setting detail: OUTPATIENT SURGERY
Discharge: HOME | End: 2023-03-28
Attending: INTERNAL MEDICINE | Admitting: INTERNAL MEDICINE
Payer: COMMERCIAL

## 2023-03-28 ENCOUNTER — ANESTHESIA EVENT (OUTPATIENT)
Dept: ENDOSCOPY | Age: 63
End: 2023-03-28
Payer: COMMERCIAL

## 2023-03-28 ENCOUNTER — ANESTHESIA (OUTPATIENT)
Dept: ENDOSCOPY | Age: 63
End: 2023-03-28
Payer: COMMERCIAL

## 2023-03-28 VITALS
WEIGHT: 195.11 LBS | TEMPERATURE: 97.8 F | OXYGEN SATURATION: 100 % | DIASTOLIC BLOOD PRESSURE: 75 MMHG | HEART RATE: 64 BPM | RESPIRATION RATE: 19 BRPM | HEIGHT: 73 IN | BODY MASS INDEX: 25.86 KG/M2 | SYSTOLIC BLOOD PRESSURE: 114 MMHG

## 2023-03-28 PROCEDURE — 88305 TISSUE EXAM BY PATHOLOGIST: CPT

## 2023-03-28 PROCEDURE — 77030013992 HC SNR POLYP ENDOSC BSC -B: Performed by: INTERNAL MEDICINE

## 2023-03-28 PROCEDURE — 76060000031 HC ANESTHESIA FIRST 0.5 HR: Performed by: INTERNAL MEDICINE

## 2023-03-28 PROCEDURE — 76040000019: Performed by: INTERNAL MEDICINE

## 2023-03-28 PROCEDURE — 74011000250 HC RX REV CODE- 250: Performed by: NURSE ANESTHETIST, CERTIFIED REGISTERED

## 2023-03-28 PROCEDURE — 2709999900 HC NON-CHARGEABLE SUPPLY: Performed by: INTERNAL MEDICINE

## 2023-03-28 PROCEDURE — 74011250636 HC RX REV CODE- 250/636: Performed by: NURSE ANESTHETIST, CERTIFIED REGISTERED

## 2023-03-28 RX ORDER — EPINEPHRINE 0.1 MG/ML
1 INJECTION INTRACARDIAC; INTRAVENOUS
Status: DISCONTINUED | OUTPATIENT
Start: 2023-03-28 | End: 2023-03-28 | Stop reason: HOSPADM

## 2023-03-28 RX ORDER — SODIUM CHLORIDE 9 MG/ML
50 INJECTION, SOLUTION INTRAVENOUS CONTINUOUS
Status: DISCONTINUED | OUTPATIENT
Start: 2023-03-28 | End: 2023-03-28 | Stop reason: HOSPADM

## 2023-03-28 RX ORDER — PROPOFOL 10 MG/ML
INJECTION, EMULSION INTRAVENOUS
Status: DISCONTINUED | OUTPATIENT
Start: 2023-03-28 | End: 2023-03-28 | Stop reason: HOSPADM

## 2023-03-28 RX ORDER — FLUMAZENIL 0.1 MG/ML
0.2 INJECTION INTRAVENOUS
Status: DISCONTINUED | OUTPATIENT
Start: 2023-03-28 | End: 2023-03-28 | Stop reason: HOSPADM

## 2023-03-28 RX ORDER — MIDAZOLAM HYDROCHLORIDE 1 MG/ML
.25-5 INJECTION, SOLUTION INTRAMUSCULAR; INTRAVENOUS
Status: DISCONTINUED | OUTPATIENT
Start: 2023-03-28 | End: 2023-03-28 | Stop reason: HOSPADM

## 2023-03-28 RX ORDER — LIDOCAINE HYDROCHLORIDE 20 MG/ML
INJECTION, SOLUTION EPIDURAL; INFILTRATION; INTRACAUDAL; PERINEURAL AS NEEDED
Status: DISCONTINUED | OUTPATIENT
Start: 2023-03-28 | End: 2023-03-28 | Stop reason: HOSPADM

## 2023-03-28 RX ORDER — NALOXONE HYDROCHLORIDE 0.4 MG/ML
0.4 INJECTION, SOLUTION INTRAMUSCULAR; INTRAVENOUS; SUBCUTANEOUS
Status: DISCONTINUED | OUTPATIENT
Start: 2023-03-28 | End: 2023-03-28 | Stop reason: HOSPADM

## 2023-03-28 RX ORDER — ATROPINE SULFATE 0.1 MG/ML
0.4 INJECTION INTRAVENOUS
Status: DISCONTINUED | OUTPATIENT
Start: 2023-03-28 | End: 2023-03-28 | Stop reason: HOSPADM

## 2023-03-28 RX ORDER — PROPOFOL 10 MG/ML
INJECTION, EMULSION INTRAVENOUS AS NEEDED
Status: DISCONTINUED | OUTPATIENT
Start: 2023-03-28 | End: 2023-03-28 | Stop reason: HOSPADM

## 2023-03-28 RX ORDER — DEXTROMETHORPHAN/PSEUDOEPHED 2.5-7.5/.8
1.2 DROPS ORAL
Status: DISCONTINUED | OUTPATIENT
Start: 2023-03-28 | End: 2023-03-28 | Stop reason: HOSPADM

## 2023-03-28 RX ORDER — SODIUM CHLORIDE 9 MG/ML
INJECTION, SOLUTION INTRAVENOUS
Status: DISCONTINUED | OUTPATIENT
Start: 2023-03-28 | End: 2023-03-28 | Stop reason: HOSPADM

## 2023-03-28 RX ADMIN — SODIUM CHLORIDE: 9 INJECTION, SOLUTION INTRAVENOUS at 08:42

## 2023-03-28 RX ADMIN — PROPOFOL 140 MCG/KG/MIN: 10 INJECTION, EMULSION INTRAVENOUS at 08:43

## 2023-03-28 RX ADMIN — LIDOCAINE HYDROCHLORIDE 50 MG: 20 INJECTION, SOLUTION EPIDURAL; INFILTRATION; INTRACAUDAL; PERINEURAL at 08:43

## 2023-03-28 RX ADMIN — PROPOFOL 100 MG: 10 INJECTION, EMULSION INTRAVENOUS at 08:45

## 2023-03-28 RX ADMIN — SODIUM CHLORIDE: 9 INJECTION, SOLUTION INTRAVENOUS at 08:51

## 2023-03-28 NOTE — H&P
Ruth Richards M.D.  [phone]            History and Physical       NAME:  Carlo Capellan :   1960   MRN:   [id number]       Referring Physician:  Emeterio Marks MD      Consult Date: 3/28/2023 8:39 AM    Chief Complaint:  Colon cancer screening    History of Present Illness:  Patient is a 65 y.o. who is seen for colon cancer screening. Denies any ongoing GI complaints. PMH:  Past Medical History:   Diagnosis Date    Arrhythmia     atrial flutter - 2021/afib - 2021    Cancer [facility])     testicular - suergery/radiation & prostate - surgery       PSH:  Past Surgical History:   Procedure Laterality Date    HX OTHER SURGICAL      testicular orchectomy    HX OTHER SURGICAL      prostatectomy    HX OTHER SURGICAL Left     removal of lipoma on arm    [place] UNLISTED PROCEDURE CARDIAC SURGERY      2 cardiac ablation       Allergies: Allergies   Allergen Reactions    Minocycline Other (comments)     Made patient feel \"achy and lethargic\"       Home Medications:  Prior to Admission Medications   Prescriptions Last Dose Informant Patient Reported? Taking?   ascorbic acid (CHEWABLE VITAMIN C PO) 3/25/2023  Yes Yes   Sig: Take 2 Tablets by mouth daily. fluticasone (FLONASE) 50 mcg/actuation nasal spray 3/28/2023 Self Yes Yes   [place] by Both Nostrils route daily. Facility-Administered Medications: None       Hospital Medications:  No current facility-administered medications for this encounter. Social History:  Social History     Tobacco Use    Smoking status: Never    Smokeless tobacco: Never   Substance Use Topics    Alcohol use:  Yes     Alcohol/week: 2.0 standard drinks     Types: 2 Cans of beer per week     Comment: daily beer or every other day       Family History:  Family History   Problem Relation Age of Onset    Stroke Mother     Heart Disease Mother     Lung Cancer Father              Review of Systems:      Constitutional: negative fever, negative chills, negative weight loss  Eyes:   negative visual changes  ENT:   negative sore throat, tongue or lip swelling  Respiratory:  negative cough, negative dyspnea  Cards:  negative for chest pain, palpitations, lower extremity edema  GI:   See HPI  :  negative for frequency, dysuria  Integument:  negative for rash and pruritus  Heme:  negative for easy bruising and gum/nose bleeding  Musculoskel: negative for myalgias,  back pain and muscle weakness  Neuro: negative for headaches, dizziness, vertigo  Psych:  negative for feelings of anxiety, depression       Objective:   Patient Vitals for the past 8 hrs:   BP Temp Pulse Resp SpO2 Height Weight   03/28/23 0732 (!) 184/82 98.1 °F (36.7 °C) 75 19 100 % 6' 1\" (1.854 m) 88.5 kg (195 lb 1.7 oz)     No intake/output data recorded. No intake/output data recorded. EXAM:     NEURO-a&o   HEENT-wnl   LUNGS-clear    COR-regular rate and rhythym     ABD-soft , no tenderness, no rebound, good bs     EXT-no edema     Data Review     No results for input(s): WBC, HGB, HCT, PLT, HGBEXT, HCTEXT, PLTEXT in the last 72 hours. No results for input(s): NA, K, CL, CO2, BUN, CREA, GLU, PHOS, CA in the last 72 hours. No results for input(s): AP, TBIL, TP, ALB, GLOB, GGT, AML, LPSE in the last 72 hours. No lab exists for component: SGOT, GPT, AMYP, HLPSE  No results for input(s): INR, PTP, APTT, INREXT in the last 72 hours. Patient Active Problem List   Diagnosis Code    Atrial flutter with rapid ventricular response (HCC) I48.92    HTN (hypertension), benign I10    Atrial flutter ([facility] [address] I48.92      Assessment:     H/o polyps   Plan:     Colonoscopy today.      Signed By: Yanni Tipton MD     3/28/2023  8:39 AM

## 2023-03-28 NOTE — PROGRESS NOTES
Kyrie Avilez.  1960  [id number]    Situation:  Verbal report received from:  Ruby Elliott RN   Procedure: Procedure(s):  COLONOSCOPY  ENDOSCOPIC POLYPECTOMY    Background:    Preoperative diagnosis: HISTORY POLYPS  Postoperative diagnosis: Diverticulosis  Colon Polyps    :  Dr. Yamila Mckeon   Assistant(s): Endoscopy RN-1: Sherrell Ramirez RN  Endoscopy RN-2: Cynthia Lopez RN    Specimens:   ID Type Source Tests Collected by Time Destination   1 : Cecal and Ascending  Colon Polyps Preservative   Sav Pham MD 3/28/2023 6324 Pathology     H. Pylori  no    Assessment:  Intra-procedure medications     Anesthesia gave intra-procedure sedation and medications, see anesthesia flow sheet yes    Intravenous fluids: NS@ KVO     Vital signs stable   yes    Abdominal assessment: round and soft   yes    Recommendation:  Discharge patient per MD order  yes.   Return to floor  outpatient  Family or Friend   spouse  Permission to share finding with family or friend yes

## 2023-03-28 NOTE — PROGRESS NOTES
Endoscopy discharge instructions have been reviewed and given to patient. The patient verbalized understanding and acceptance of instructions. Dr. Tariq Silvestre  discussed with spouse procedure findings and next steps.

## 2023-03-28 NOTE — DISCHARGE INSTRUCTIONS
[address] [facility]. Elisa Magdaleno M.D.  [phone]          COLON DISCHARGE INSTRUCTIONS       3/28/2023    Chloe Hilton :  1960  [place] Medical Record Number:  [id number]      COLONOSCOPY FINDINGS:  Your colonoscopy showed: 8 polyps removed and diverticulosis noted. DISCOMFORT:  Redness at IV site- apply warm compress to area; if redness or soreness persist- contact your physician  There may be a slight amount of blood passed from the rectum  Gaseous discomfort- walking, belching will help relieve any discomfort  You may not operate a vehicle for 12 hours  You may not engage in an occupation involving machinery or appliances for rest of today  You may not drink alcoholic beverages for at least 12 hours  Avoid making any critical decisions for at least 24 hour  DIET:   High fiber diet. - however -  remember your colon is empty and a heavy meal will produce gas. Avoid these foods:  vegetables, fried / greasy foods, carbonated drinks for today     ACTIVITY:  You may resume your normal daily activities it is recommended that you spend the remainder of the day resting -  avoid any strenuous activity. CALL M.D. ANY SIGN OF:   Increasing pain, nausea, vomiting  Abdominal distension (swelling)  New increased bleeding (oral or rectal)  Fever (chills)  Pain in chest area  Bloody discharge from nose or mouth   Shortness of breath    Follow-up Instructions:   Call Dr. Daphne Ross if any questions or problems. Telephone # [phone]  Biopsy results will be available in  5 to 7 days  Should have a repeat colonoscopy in 3 years.

## 2023-03-28 NOTE — ANESTHESIA POSTPROCEDURE EVALUATION
Procedure(s):  COLONOSCOPY  ENDOSCOPIC POLYPECTOMY. MAC    Anesthesia Post Evaluation      Multimodal analgesia: multimodal analgesia used between 6 hours prior to anesthesia start to PACU discharge  Patient location during evaluation: bedside  Patient participation: complete - patient participated  Level of consciousness: awake and sleepy but conscious  Pain score: 0  Pain management: adequate  Airway patency: patent  Anesthetic complications: no  Cardiovascular status: acceptable  Respiratory status: acceptable  Hydration status: acceptable  Comments: Immediate cv/pulm status within acceptable preop limits. Post anesthesia nausea and vomiting:  controlled  Final Post Anesthesia Temperature Assessment:  Normothermia (36.0-37.5 degrees C)      INITIAL Post-op Vital signs:   Vitals Value Taken Time   BP 93/79 03/28/23 0912   Temp     Pulse 74 03/28/23 0913   Resp 14 03/28/23 0913   SpO2 97 % 03/28/23 0913   Vitals shown include unvalidated device data.

## 2023-03-28 NOTE — PERIOP NOTES
Received recovery report from Anesthesia team, see anesthesia note. ABD remains soft and non-tender post procedure. Pt has no complaints at this time and tolerated the procedure well. Endoscope was pre-cleaned at bedside immediately following procedure by [facility] gerow RN. Post recovery report given to [facility].

## 2023-03-28 NOTE — PROCEDURES
Misha Layton M.D.  [phone]            3/28/2023          Colonoscopy Operative Report  Star Screws. :  1960  [facility] Medical Record Number:  [id number]      Indications:    Personal history of colon polyps (screening only)     :  Angeles Garay MD    Assistant -- None  Implants -- None    Referring Provider: Other, MD Sandy    Sedation:  MAC anesthesia Propofol    Pre-Procedural Exam:      Airway: clear,  No airway problems anticipated  Heart: RRR, without gallops or rubs  Lungs: clear bilaterally without wheezes, crackles, or rhonchi  Abdomen: soft, nontender, nondistended, bowel sounds present  Mental Status: awake, alert and oriented to person, place and time     Procedure Details:  After informed consent was obtained with all risks and benefits of procedure explained and preoperative exam completed, the patient was taken to the endoscopy suite and placed in the left lateral decubitus position. Upon sequential sedation as per above, a digital rectal exam was performed. The Olympus videocolonoscope  was inserted in the rectum and carefully advanced to the terminal ileum. The quality of preparation was good. The colonoscope was slowly withdrawn with careful inspection and evaluation between folds. Retroflexion in the rectum was performed. Findings:   Terminal Ileum: normal  Cecum: 3  Sessile polyp(s), the largest 8 mm in size;  Ascending Colon:  5  Sessile polyp(s), the largest 8 mm in size;  Transverse Colon: normal  Descending Colon:     - Diverticulosis  Sigmoid:     - Diverticulosis  Rectum: normal    Interventions:  8 complete polypectomy were performed using cold snare  and the polyps were  retrieved    Specimen Removed:  specimen #1, 8 mm in size, located in the cecum and the ascending colon removed by cold snare and retrieved for pathology    Complications: None. EBL:  None.     Impression:  8 polyps removed as above Moderate left colonic diverticulosis noted    Recommendations:  -Await pathology. -Repeat colonoscopy in 3 years. Discharge Disposition:  Home in the company of a  when able to ambulate.     Precious Hector MD  3/28/2023  9:04 AM

## 2023-03-28 NOTE — ANESTHESIA PREPROCEDURE EVALUATION
Relevant Problems   No relevant active problems       Anesthetic History               Review of Systems / Medical History  Patient summary reviewed, nursing notes reviewed and pertinent labs reviewed    Pulmonary                   Neuro/Psych              Cardiovascular    Hypertension        Dysrhythmias         Comments: Denied recent cv/pulm complaints or changes. GI/Hepatic/Renal               Comments: Denied active reflux symptoms Endo/Other             Other Findings              Physical Exam    Airway  Mallampati: II  TM Distance: > 6 cm  Neck ROM: normal range of motion   Mouth opening: Normal     Cardiovascular  Regular rate and rhythm,  S1 and S2 normal,  no murmur, click, rub, or gallop  Rhythm: regular  Rate: normal         Dental      Comments: No loose teeth.    Pulmonary  Breath sounds clear to auscultation               Abdominal  Abdominal exam normal       Other Findings            Anesthetic Plan    ASA: 2  Anesthesia type: MAC          Induction: Intravenous  Anesthetic plan and risks discussed with: Patient and Family

## 2023-03-29 ENCOUNTER — OFFICE VISIT (OUTPATIENT)
Dept: CARDIOLOGY CLINIC | Age: 63
End: 2023-03-29

## 2023-03-29 VITALS
RESPIRATION RATE: 16 BRPM | WEIGHT: 191 LBS | HEART RATE: 75 BPM | OXYGEN SATURATION: 99 % | SYSTOLIC BLOOD PRESSURE: 132 MMHG | BODY MASS INDEX: 25.31 KG/M2 | DIASTOLIC BLOOD PRESSURE: 78 MMHG | HEIGHT: 73 IN

## 2023-03-29 DIAGNOSIS — Z98.890 STATUS POST ABLATION OF ATRIAL FLUTTER: ICD-10-CM

## 2023-03-29 DIAGNOSIS — Z98.890 S/P ABLATION OF ATRIAL FLUTTER: ICD-10-CM

## 2023-03-29 DIAGNOSIS — I48.3 TYPICAL ATRIAL FLUTTER (HCC): ICD-10-CM

## 2023-03-29 DIAGNOSIS — Z86.79 S/P ABLATION OF ATRIAL FLUTTER: ICD-10-CM

## 2023-03-29 DIAGNOSIS — Z86.79 STATUS POST ABLATION OF ATRIAL FLUTTER: ICD-10-CM

## 2023-03-29 DIAGNOSIS — I48.0 PAROXYSMAL ATRIAL FIBRILLATION (HCC): Primary | ICD-10-CM

## 2023-03-29 DIAGNOSIS — I48.92 ATRIAL FLUTTER WITH RAPID VENTRICULAR RESPONSE (HCC): ICD-10-CM

## 2023-03-29 NOTE — PROGRESS NOTES
HISTORY OF PRESENTING ILLNESS      Natasha Das is a 65 y.o. male who is s/p AFL/CTI ablation and AF ablation 2021 and is here for follow up of his arrhythmia. He denies cardiac complaints. PAST MEDICAL HISTORY     Past Medical History:   Diagnosis Date    Arrhythmia     atrial flutter - 7/2021/afib - 11/2021    Cancer [facility])     testicular - suergery/radiation & prostate - surgery           PAST SURGICAL HISTORY     Past Surgical History:   Procedure Laterality Date    COLONOSCOPY N/A 3/28/2023    COLONOSCOPY performed by Nate Castro MD at [facility] ENDOSCOPY    HX OTHER SURGICAL      testicular orchectomy    HX OTHER SURGICAL      prostatectomy    HX OTHER SURGICAL Left     removal of lipoma on arm    [place] UNLISTED PROCEDURE CARDIAC SURGERY      2 cardiac ablation          ALLERGIES     Allergies   Allergen Reactions    Minocycline Other (comments)     Made patient feel \"achy and lethargic\"          FAMILY HISTORY     Family History   Problem Relation Age of Onset    Stroke Mother     Heart Disease Mother     Lung Cancer Father     negative for cardiac disease       SOCIAL HISTORY     Social History     Socioeconomic History    Marital status:    Tobacco Use    Smoking status: Never    Smokeless tobacco: Never   Vaping Use    Vaping Use: Never used   Substance and Sexual Activity    Alcohol use: Yes     Alcohol/week: 2.0 standard drinks     Types: 2 Cans of beer per week     Comment: daily beer or every other day    Drug use: Never         MEDICATIONS     Current Outpatient Medications   Medication Sig    ascorbic acid (CHEWABLE VITAMIN C PO) Take 2 Tablets by mouth daily. fluticasone (FLONASE) 50 mcg/actuation nasal spray 1 [place] by Both Nostrils route daily. No current facility-administered medications for this visit. I have reviewed the nurses notes, vitals, problem list, allergy list, medical history, family, social history and medications.        REVIEW OF SYMPTOMS General: Pt denies excessive weight gain or loss. Pt is able to conduct ADL's  HEENT: Denies blurred vision, headaches, hearing loss, epistaxis and difficulty swallowing. Respiratory: Denies cough, congestion, shortness of breath, LORA, wheezing or stridor. Cardiovascular: Denies precordial pain, palpitations, edema or PND  Gastrointestinal: Denies poor appetite, indigestion, abdominal pain or blood in stool  Genitourinary: Denies hematuria, dysuria, increased urinary frequency  Musculoskeletal: Denies joint pain or swelling from muscles or joints  Neurologic: Denies tremor, paresthesias, headache, or sensory motor disturbance  Psychiatric: Denies confusion, insomnia, depression  Integumentray: Denies rash, itching or ulcers. Hematologic: Denies easy bruising, bleeding       PHYSICAL EXAMINATION    Visit Vitals  /78 (BP 1 Location: Left upper arm, BP Patient Position: Sitting, BP Cuff Size: Adult)   Pulse 75   Resp 16   Ht 6' 1\" (1.854 m)   Wt 191 lb (86.6 kg)   SpO2 99%   BMI 25.20 kg/m²       General: Well developed, in no acute distress. HEENT: No jaundice, oral mucosa moist, no oral ulcers  Neck: Supple, no stiffness, no lymphadenopathy, supple  Heart:  Normal rate Regular rhythm no murmur, gallop or rub, no jugular venous distention  Respiratory: Clear bilaterally x 4, no wheezing or rales  Abdomen:   Soft, non-tender, bowel sounds are active. Extremities:  No edema   Musculoskeletal: No clubbing, no deformities  Neuro: A&Ox3, speech clear, gait stable no focal neurologic deficits  Skin: Skin color is normal. No rashes or lesions.  Non diaphoretic           DIAGNOSTIC DATA      EKG: NSR    Visit Vitals  /78 (BP 1 Location: Left upper arm, BP Patient Position: Sitting, BP Cuff Size: Adult)   Pulse 75   Resp 16   Ht 6' 1\" (1.854 m)   Wt 191 lb (86.6 kg)   SpO2 99%   BMI 25.20 kg/m²        LABORATORY DATA      Lab Results   Component Value Date/Time    WBC 4.5 10/26/2021 09:30 AM    HGB 15.0 10/26/2021 09:30 AM    HCT 43.1 10/26/2021 09:30 AM    PLATELET 222 09/35/3618 09:30 AM    MCV 93 10/26/2021 09:30 AM      Lab Results   Component Value Date/Time    Sodium 141 10/26/2021 09:30 AM    Potassium 4.7 10/26/2021 09:30 AM    Chloride 104 10/26/2021 09:30 AM    CO2 26 10/26/2021 09:30 AM    Anion gap 1 (L) 07/27/2021 03:11 PM    Glucose 94 10/26/2021 09:30 AM    BUN 15 10/26/2021 09:30 AM    Creatinine 1.00 10/26/2021 09:30 AM    BUN/Creatinine ratio 15 10/26/2021 09:30 AM    GFR est AA 94 10/26/2021 09:30 AM    GFR est non-AA 81 10/26/2021 09:30 AM    Calcium 9.3 10/26/2021 09:30 AM    Bilirubin, total 0.6 07/27/2021 03:11 PM    Alk. phosphatase 76 07/27/2021 03:11 PM    Protein, total 7.1 07/27/2021 03:11 PM    Albumin 3.9 07/27/2021 03:11 PM    Globulin 3.2 07/27/2021 03:11 PM    A-G Ratio 1.2 07/27/2021 03:11 PM    ALT (SGPT) 35 07/27/2021 03:11 PM           ASSESSMENT      1. Atrial flutter typical type   S/p CTI ablation 7/2021  2. Atrial fibrillation   AF ablation 11/2021  3.  Trace aortic regurgitation on echo       PLAN     He denies recurrence of AF and  monitoring it via Watch and madiha  Encouraged exercise and he does it regularly  BP is normal  He does not take meds regularly and will see PCP once a year  Follow up with EP MD Shanta Mcclure [address]  [address], [facility]  [phone] / [phone] Fax   [phone] / [phone] Fax

## 2023-03-29 NOTE — PROGRESS NOTES
Room #: 2    No complaints. Chief Complaint   Patient presents with    Annual Exam    Irregular Heart Beat     AF, AFL, PVC       Visit Vitals  /78 (BP 1 Location: Left upper arm, BP Patient Position: Sitting, BP Cuff Size: Adult)   Pulse 75   Resp 16   Ht 6' 1\" (1.854 m)   Wt 191 lb (86.6 kg)   SpO2 99%   BMI 25.20 kg/m²         Chest pain:  NO  Shortness of breath:  NO  Edema: NO  Palpitations, skipped beats, rapid heartbeat:  NO  Dizziness:  NO    1. Have you been to the ER, urgent care clinic since your last visit? Hospitalized since your last visit? NO    2. Have you seen or consulted any other health care providers outside of the [address] [place] [address] since your last visit? Include any pap smears or colon screening.  NO      Refills:  NO

## 2023-07-05 LAB — INR BLD: 3.1

## 2023-07-06 ENCOUNTER — ANTI-COAG VISIT (OUTPATIENT)
Age: 63
End: 2023-07-06

## 2023-07-06 PROCEDURE — PBSHW PROTIME-INR: Performed by: SPECIALIST

## 2024-01-20 NOTE — ED NOTES
Patient resting on the stretcher, wife at bedside. V/S reassessed. Call bell within reach; will continue to monitor. I have seen and examined this patient and fully participated in the care of this patient as the teaching attending.  The service was shared with the ROB.  I reviewed and verified the documentation. Attending with

## (undated) DEVICE — Device: Brand: PADPRO

## (undated) DEVICE — CANN NASAL O2 CAPNOGRAPHY AD -- FILTERLINE

## (undated) DEVICE — PINNACLE INTRODUCER SHEATH: Brand: PINNACLE

## (undated) DEVICE — DRESSING HEMOSTATIC SFT INTVENT W/O SLT DBL WRP QUIKCLOT LF

## (undated) DEVICE — CABLE CATH L10FT BLU CONN 10-34 PIN ELECTROGRAM CONDUCTION

## (undated) DEVICE — COVER CATH ACUNAV ULTRASOUND 5X72IN ANTI STATIC

## (undated) DEVICE — CATH NAVISTAR BIDIR FJ 8MM --

## (undated) DEVICE — POLYP TRAP: Brand: TRAPEASE®

## (undated) DEVICE — NEEDLE ANGIO 18GAX7CM SECURELOC

## (undated) DEVICE — TUBE SET IRR PUMP THERMALCOOL -- SMARTABLATE

## (undated) DEVICE — CATH IV AUTOGRD BC PNK 20GA 25 -- INSYTE

## (undated) DEVICE — BAG TRASH WST 122 CM 183 CM 1400 CC FEMALE LUER PVC DEPOT

## (undated) DEVICE — CATHETER ABLAT 8FR L115CM 1-6-2MM SPC TIP 3.5MM FJ CRV

## (undated) DEVICE — CATH EP MAP 2-6-2 7FR D CRV -- PENTARAY

## (undated) DEVICE — SENSOR OXMTR SPO2 ADLT NELLCOR DISP

## (undated) DEVICE — PATCH CARTO 3 EXT REF --

## (undated) DEVICE — BAG SPEC BIOHZRD 10 X 10 IN --

## (undated) DEVICE — BAG BELONG PT PERS CLEAR HANDL

## (undated) DEVICE — PRESSURE MONITORING SET: Brand: TRUWAVE

## (undated) DEVICE — 1 X VERSACROSS TRANSSEPTAL SHEATH (INCLUDING  1 X J-TIP GUIDEWIRE); 1 X VERSACROSS RF WIRE (INCLUDING 1 X CONNECTOR CABLE (SINGLE USE)); 1 X DISPERSIVE ELECTRODE: Brand: VERSACROSS ACCESS SOLUTION

## (undated) DEVICE — CABLE CATH L10FT RED PIN CONN 25-34 FOR NAVISTAR CARTO 3

## (undated) DEVICE — SHTH GUID 8.5F 17MM SM CRV -- CARTO VIZIGO

## (undated) DEVICE — 1200 GUARD II KIT W/5MM TUBE W/O VAC TUBE: Brand: GUARDIAN

## (undated) DEVICE — CUFF RMFG BP INF SZ 11 DISP -- LAWSON OEM ITEM 238915

## (undated) DEVICE — INTRO SHTH HMSTAS 8.5FR 60CM --

## (undated) DEVICE — KIT COLON DUAL END BRSH W/LUBE -- CUSTOM BX/20 FORMERLY KS-18-20

## (undated) DEVICE — TRAP,MUCUS SPECIMEN, 80CC: Brand: MEDLINE

## (undated) DEVICE — CONTAINER SPEC 20 ML LID NEUT BUFF FORMALIN 10 % POLYPR STS

## (undated) DEVICE — REM POLYHESIVE ADULT PATIENT RETURN ELECTRODE: Brand: VALLEYLAB

## (undated) DEVICE — CATH BI DIR 7FR DEFL CS NON --

## (undated) DEVICE — SYR 3ML LL TIP 1/10ML GRAD --

## (undated) DEVICE — BASIN EMSIS 16OZ GRAPHITE PLAS KID SHP MOLD GRAD FOR ORAL

## (undated) DEVICE — PERCLOSE PROGLIDE™ SUTURE-MEDIATED CLOSURE SYSTEM: Brand: PERCLOSE PROGLIDE™

## (undated) DEVICE — SOLIDIFIER MEDC 1200ML -- CONVERT TO 356117

## (undated) DEVICE — CABLE CATH L10FT RED PIN CONN 34-34 FOR THERMOCOOL

## (undated) DEVICE — SIMPLICITY FLUFF UNDERPAD 23X36, MODERATE: Brand: SIMPLICITY

## (undated) DEVICE — SNARE ENDOSCP M L240CM W27MM SHTH DIA2.4MM CHN 2.8MM OVL

## (undated) DEVICE — Device

## (undated) DEVICE — ELECTRODE,RADIOTRANSLUCENT,FOAM,3PK: Brand: MEDLINE

## (undated) DEVICE — PROBE ES TEMP HOT AND CLD FAST ACCURATE SFT FLX CIRCA S CATH

## (undated) DEVICE — SET GRAV CK VLV NEEDLESS ST 3 GANGED 4WAY STPCOCK HI FLO 10

## (undated) DEVICE — MEDI-TRACE CADENCE ADULT, DEFIBRILLATION ELECTRODE -RTS  (10 PR/PK) - PHYSIO-CONTROL: Brand: MEDI-TRACE CADENCE

## (undated) DEVICE — THE DV8® RETRACTOR ESOPHAGEAL BALLOON (DV8 RETRACTOR) IS A NON-STERILE, SINGLE-USE, DISPOSABLE ESOPHAGEAL BALLOON RETRACTOR. THE DV8 RETRACTOR IS DEPLOYED ORALLY AND INFLATED INSIDE THE ESOPHAGUS. THE RETRACTOR GENTLY AND EFFECTIVELY RETRACTS THE ESOPHAGUS TO CREATE A STABLE OPERATING FIELD.: Brand: DV8® RETRACTOR

## (undated) DEVICE — CABLE CATH L2.7M CONNECTS TO CARTO 3 SYS PIU FOR LASSO ECO

## (undated) DEVICE — CATHETER US 8FR L90CM GRN TIP OVERLAY FOR GE-VIVID I VIVID

## (undated) DEVICE — CABLE CONN TO CATH TO CARTO 3 --

## (undated) DEVICE — HEART CATH-MRMC: Brand: MEDLINE INDUSTRIES, INC.

## (undated) DEVICE — 3M™ CUROS™ DISINFECTING CAP FOR NEEDLELESS CONNECTORS 270/CARTON 20 CARTONS/CASE CFF1-270: Brand: CUROS™